# Patient Record
Sex: MALE | Race: WHITE | NOT HISPANIC OR LATINO | URBAN - METROPOLITAN AREA
[De-identification: names, ages, dates, MRNs, and addresses within clinical notes are randomized per-mention and may not be internally consistent; named-entity substitution may affect disease eponyms.]

---

## 2019-11-23 ENCOUNTER — HOSPITAL ENCOUNTER (EMERGENCY)
Facility: HOSPITAL | Age: 39
Discharge: HOME | End: 2019-11-23
Attending: EMERGENCY MEDICINE
Payer: MEDICAID

## 2019-11-23 VITALS
WEIGHT: 150 LBS | RESPIRATION RATE: 16 BRPM | TEMPERATURE: 98 F | OXYGEN SATURATION: 96 % | HEART RATE: 84 BPM | BODY MASS INDEX: 24.99 KG/M2 | SYSTOLIC BLOOD PRESSURE: 131 MMHG | DIASTOLIC BLOOD PRESSURE: 87 MMHG | HEIGHT: 65 IN

## 2019-11-23 DIAGNOSIS — T40.601A: ICD-10-CM

## 2019-11-23 LAB
AMPHET+METHAMPHET UR QL: NORMAL
BARBITURATES UR QL SCN>200 NG/ML: NEGATIVE
BENZODIAZ UR QL SCN>200 NG/ML: NEGATIVE
BZE UR QL SCN: NORMAL
CANNABINOIDS UR QL SCN: NEGATIVE
CREAT UR-MCNC: 69 MG/DL (ref 23–375)
METHADONE UR QL SCN>300 NG/ML: NEGATIVE
OPIATES UR QL SCN: NORMAL
PCP UR QL SCN>25 NG/ML: NEGATIVE
POCT GLUCOSE: 86 MG/DL (ref 70–110)
TOXICOLOGY INFORMATION: NORMAL

## 2019-11-23 PROCEDURE — 99284 EMERGENCY DEPT VISIT MOD MDM: CPT | Mod: 25

## 2019-11-23 PROCEDURE — 99284 EMERGENCY DEPT VISIT MOD MDM: CPT | Mod: ,,, | Performed by: EMERGENCY MEDICINE

## 2019-11-23 PROCEDURE — 80307 DRUG TEST PRSMV CHEM ANLYZR: CPT

## 2019-11-23 PROCEDURE — 93010 EKG 12-LEAD: ICD-10-PCS | Mod: ,,, | Performed by: INTERNAL MEDICINE

## 2019-11-23 PROCEDURE — 93005 ELECTROCARDIOGRAM TRACING: CPT

## 2019-11-23 PROCEDURE — 99284 PR EMERGENCY DEPT VISIT,LEVEL IV: ICD-10-PCS | Mod: ,,, | Performed by: EMERGENCY MEDICINE

## 2019-11-23 PROCEDURE — 93010 ELECTROCARDIOGRAM REPORT: CPT | Mod: ,,, | Performed by: INTERNAL MEDICINE

## 2019-11-23 PROCEDURE — 82962 GLUCOSE BLOOD TEST: CPT

## 2019-11-23 RX ORDER — NALOXONE HYDROCHLORIDE 4 MG/.1ML
SPRAY NASAL
Qty: 2 EACH | Refills: 11 | Status: ON HOLD | OUTPATIENT
Start: 2019-11-23 | End: 2022-08-08 | Stop reason: HOSPADM

## 2019-11-24 NOTE — ED PROVIDER NOTES
Encounter Date: 11/23/2019       History     Chief Complaint   Patient presents with    Altered Mental Status     initial GCS 3, given 2 narcan IN prior to EMS arrival - patient now has GCS 14      39-year-old male presents with an episode of altered mental status with associated pinpoint pupils that responded after receiving 2 mg of Narcan intranasally.  The paramedics tell me that he was at a house with friends who called stating he was unresponsive.  They found him with a decreased respiratory rate but not cyanotic.  He responded almost immediately to Narcan.  Patient states he feels normal now and is not sure what happened.  Patient states he denies headache, chest pain, shortness of breath, pain anywhere.        Review of patient's allergies indicates:  No Known Allergies  No past medical history on file.  No past surgical history on file.  No family history on file.  Social History     Tobacco Use    Smoking status: Not on file   Substance Use Topics    Alcohol use: Not on file    Drug use: Not on file     Review of Systems   Constitutional: Negative for chills and fever.   HENT: Negative for congestion.    Eyes: Negative for visual disturbance.   Respiratory: Negative for shortness of breath.    Cardiovascular: Negative for chest pain.   Gastrointestinal: Negative for abdominal pain.   Endocrine: Negative for polyuria.   Genitourinary: Negative for difficulty urinating.   Musculoskeletal: Negative for arthralgias.   Skin: Negative for rash.   Neurological: Negative for dizziness and headaches.   Hematological: Negative for adenopathy.   Psychiatric/Behavioral: Negative for agitation and suicidal ideas.       Physical Exam     Initial Vitals [11/23/19 1848]   BP Pulse Resp Temp SpO2   134/72 110 20 97.8 °F (36.6 °C) 98 %      MAP       --         Physical Exam    Constitutional: He appears well-developed and well-nourished.   HENT:   Head: Normocephalic and atraumatic.   Eyes: EOM are normal. Pupils are  equal, round, and reactive to light.   Neck: Normal range of motion. Neck supple.   Cardiovascular: Normal rate, regular rhythm, normal heart sounds and intact distal pulses.   Pulmonary/Chest: Breath sounds normal.   Abdominal: Soft. Bowel sounds are normal. He exhibits no distension.   Musculoskeletal: Normal range of motion.   Neurological: He is alert. He has normal strength. No cranial nerve deficit or sensory deficit. GCS score is 15. GCS eye subscore is 4. GCS verbal subscore is 5. GCS motor subscore is 6.   Skin: Skin is warm and dry. Capillary refill takes less than 2 seconds.   Psychiatric: He has a normal mood and affect.         ED Course   Procedures  Labs Reviewed   DRUG SCREEN PANEL, URINE EMERGENCY    Narrative:     yellow and grey   POCT GLUCOSE        ECG Results          EKG 12-lead (In process)  Result time 11/23/19 22:20:40    In process by Interface, Lab In [facility] (11/23/19 22:20:40)                 Narrative:    Test Reason : T40.601A,    Vent. Rate : 084 BPM     Atrial Rate : 084 BPM     P-R Int : 128 ms          QRS Dur : 106 ms      QT Int : 394 ms       P-R-T Axes : 079 061 061 degrees     QTc Int : 465 ms    Normal sinus rhythm  Voltage criteria for left ventricular hypertrophy  Abnormal ECG  When compared with ECG of 14-OCT-1993 19:17,  PREVIOUS ECG IS PRESENT    Referred By: [email or web address]   SELF           Confirmed By:                             Imaging Results          X-Ray Chest 1 View (Final result)  Result time 11/23/19 21:20:23    Final result by Sherif Zavaleta MD (11/23/19 21:20:23)                 Impression:      No acute findings in the chest.      Electronically signed by: Sherif Zavaleta MD  Date:    11/23/2019  Time:    21:20             Narrative:    EXAMINATION:  XR CHEST 1 VIEW    CLINICAL HISTORY:  Poisoning by unspecified narcotics, accidental (unintentional), initial encounter    TECHNIQUE:  Single frontal view of the chest was  performed.    COMPARISON:  None    FINDINGS:  No consolidation, pleural effusion or pneumothorax.    Cardiomediastinal silhouette is unremarkable.                                 Medical Decision Making:   History:   Old Medical Records: I decided to obtain old medical records.  Initial Assessment:   Patient with a syndrome consistent with opiate overdose.  Will monitor for rebound  Clinical Tests:   Radiological Study: Ordered and Reviewed  Medical Tests: Ordered and Reviewed  ED Management:  7:27 PM  I reassessed the patient.  He states these not sure what happened.  He would like a tox screen run to see what type of drugs or in his system.  We discussed opiate overdose and need for observation here.  His vital signs are stable. His pulse ox is 98%.  His lungs are clear.  Mental status is clear.  Will run a tox screen, EKG, blood sugar.      11:04 PM  Patient has had an uneventful stay.  He was sleeping for several hours but maintaining his oxygenation and always easily arousable.  Right now he is easily arousable and alert with a GCS of 15.  He is stable for discharge. I counseled him regarding drug use.  His father is coming to pick him up.  I wrote a prescription for Narcan                                 Clinical Impression:       ICD-10-CM ICD-9-CM   1. Overdose opiate T40.601A 965.00     E850.2         Disposition:   Disposition: Discharged  Condition: Stable                     Patrick Guzman MD  11/23/19 [id number]

## 2019-11-24 NOTE — ED NOTES
Report received from QUE Salazar. Care assumed. Family at bedside. Discharge instructions, diagnosis, medications, and follow up discussed with patient. Patient verbalized understanding. All questions and concerns answered. No needs expressed at the time. Pt is awake, alert and oriented x4 with no acute distress noted. Respirations even and unlabored. Ambulatory out of ED.

## 2020-06-08 ENCOUNTER — HOSPITAL ENCOUNTER (EMERGENCY)
Facility: HOSPITAL | Age: 40
Discharge: HOME | End: 2020-06-08
Attending: EMERGENCY MEDICINE
Payer: MEDICAID

## 2020-06-08 VITALS
HEIGHT: 66 IN | DIASTOLIC BLOOD PRESSURE: 115 MMHG | WEIGHT: 155 LBS | TEMPERATURE: 99 F | HEART RATE: 107 BPM | RESPIRATION RATE: 22 BRPM | BODY MASS INDEX: 24.91 KG/M2 | OXYGEN SATURATION: 97 % | SYSTOLIC BLOOD PRESSURE: 149 MMHG

## 2020-06-08 DIAGNOSIS — S81.802A WOUND OF LEFT LOWER EXTREMITY, INITIAL ENCOUNTER: Primary | ICD-10-CM

## 2020-06-08 DIAGNOSIS — R58 BLEEDING: ICD-10-CM

## 2020-06-08 LAB
ABO + RH BLD: NORMAL
ALBUMIN SERPL BCP-MCNC: 3.9 G/DL (ref 3.5–5.2)
ALP SERPL-CCNC: 67 U/L (ref 55–135)
ALT SERPL W/O P-5'-P-CCNC: 17 U/L (ref 10–44)
ANION GAP SERPL CALC-SCNC: 9 MMOL/L (ref 8–16)
AST SERPL-CCNC: 25 U/L (ref 10–40)
BASOPHILS # BLD AUTO: 0.07 K/UL (ref 0–0.2)
BASOPHILS NFR BLD: 0.9 % (ref 0–1.9)
BILIRUB SERPL-MCNC: 1 MG/DL (ref 0.1–1)
BLD GP AB SCN CELLS X3 SERPL QL: NORMAL
BUN SERPL-MCNC: 29 MG/DL (ref 6–20)
CALCIUM SERPL-MCNC: 9.1 MG/DL (ref 8.7–10.5)
CHLORIDE SERPL-SCNC: 108 MMOL/L (ref 95–110)
CO2 SERPL-SCNC: 23 MMOL/L (ref 23–29)
CREAT SERPL-MCNC: 1.3 MG/DL (ref 0.5–1.4)
DIFFERENTIAL METHOD: ABNORMAL
EOSINOPHIL # BLD AUTO: 0.1 K/UL (ref 0–0.5)
EOSINOPHIL NFR BLD: 1.2 % (ref 0–8)
ERYTHROCYTE [DISTWIDTH] IN BLOOD BY AUTOMATED COUNT: 13.2 % (ref 11.5–14.5)
EST. GFR  (AFRICAN AMERICAN): >60 ML/MIN/1.73 M^2
EST. GFR  (NON AFRICAN AMERICAN): >60 ML/MIN/1.73 M^2
GLUCOSE SERPL-MCNC: 117 MG/DL (ref 70–110)
HCT VFR BLD AUTO: 34.6 % (ref 40–54)
HGB BLD-MCNC: 12.1 G/DL (ref 14–18)
IMM GRANULOCYTES # BLD AUTO: 0.03 K/UL (ref 0–0.04)
IMM GRANULOCYTES NFR BLD AUTO: 0.4 % (ref 0–0.5)
INR PPP: 1 (ref 0.8–1.2)
LYMPHOCYTES # BLD AUTO: 2.2 K/UL (ref 1–4.8)
LYMPHOCYTES NFR BLD: 27.9 % (ref 18–48)
MCH RBC QN AUTO: 29 PG (ref 27–31)
MCHC RBC AUTO-ENTMCNC: 35 G/DL (ref 32–36)
MCV RBC AUTO: 83 FL (ref 82–98)
MONOCYTES # BLD AUTO: 0.7 K/UL (ref 0.3–1)
MONOCYTES NFR BLD: 8.8 % (ref 4–15)
NEUTROPHILS # BLD AUTO: 4.8 K/UL (ref 1.8–7.7)
NEUTROPHILS NFR BLD: 60.8 % (ref 38–73)
NRBC BLD-RTO: 0 /100 WBC
PLATELET # BLD AUTO: 301 K/UL (ref 150–350)
PMV BLD AUTO: 10.5 FL (ref 9.2–12.9)
POTASSIUM SERPL-SCNC: 3.8 MMOL/L (ref 3.5–5.1)
PROT SERPL-MCNC: 7.5 G/DL (ref 6–8.4)
PROTHROMBIN TIME: 10.7 SEC (ref 9–12.5)
RBC # BLD AUTO: 4.17 M/UL (ref 4.6–6.2)
SODIUM SERPL-SCNC: 140 MMOL/L (ref 136–145)
WBC # BLD AUTO: 7.81 K/UL (ref 3.9–12.7)

## 2020-06-08 PROCEDURE — 86901 BLOOD TYPING SEROLOGIC RH(D): CPT

## 2020-06-08 PROCEDURE — 25000003 PHARM REV CODE 250: Performed by: STUDENT IN AN ORGANIZED HEALTH CARE EDUCATION/TRAINING PROGRAM

## 2020-06-08 PROCEDURE — 12034 INTMD RPR S/TR/EXT 7.6-12.5: CPT | Mod: LT

## 2020-06-08 PROCEDURE — 63600175 PHARM REV CODE 636 W HCPCS: Performed by: STUDENT IN AN ORGANIZED HEALTH CARE EDUCATION/TRAINING PROGRAM

## 2020-06-08 PROCEDURE — 85610 PROTHROMBIN TIME: CPT

## 2020-06-08 PROCEDURE — 99284 EMERGENCY DEPT VISIT MOD MDM: CPT | Mod: 25

## 2020-06-08 PROCEDURE — 99284 PR EMERGENCY DEPT VISIT,LEVEL IV: ICD-10-PCS | Mod: 25,,, | Performed by: EMERGENCY MEDICINE

## 2020-06-08 PROCEDURE — 96375 TX/PRO/DX INJ NEW DRUG ADDON: CPT | Mod: 59

## 2020-06-08 PROCEDURE — 25000003 PHARM REV CODE 250: Performed by: EMERGENCY MEDICINE

## 2020-06-08 PROCEDURE — 99284 EMERGENCY DEPT VISIT MOD MDM: CPT | Mod: 25,,, | Performed by: EMERGENCY MEDICINE

## 2020-06-08 PROCEDURE — 12034 INTMD RPR S/TR/EXT 7.6-12.5: CPT | Mod: LT,,, | Performed by: EMERGENCY MEDICINE

## 2020-06-08 PROCEDURE — 63600175 PHARM REV CODE 636 W HCPCS: Performed by: EMERGENCY MEDICINE

## 2020-06-08 PROCEDURE — 12034 PR LAYR CLOS WND TRUNK,ARM,LEG 7.6-12.5 CM: ICD-10-PCS | Mod: LT,,, | Performed by: EMERGENCY MEDICINE

## 2020-06-08 PROCEDURE — 93005 ELECTROCARDIOGRAM TRACING: CPT | Mod: 59

## 2020-06-08 PROCEDURE — 96365 THER/PROPH/DIAG IV INF INIT: CPT | Mod: 59

## 2020-06-08 PROCEDURE — 80053 COMPREHEN METABOLIC PANEL: CPT

## 2020-06-08 PROCEDURE — 93010 EKG 12-LEAD: ICD-10-PCS | Mod: ,,, | Performed by: INTERNAL MEDICINE

## 2020-06-08 PROCEDURE — 85025 COMPLETE CBC W/AUTO DIFF WBC: CPT

## 2020-06-08 PROCEDURE — 93010 ELECTROCARDIOGRAM REPORT: CPT | Mod: ,,, | Performed by: INTERNAL MEDICINE

## 2020-06-08 RX ORDER — LIDOCAINE HYDROCHLORIDE AND EPINEPHRINE 10; 10 MG/ML; UG/ML
20 INJECTION, SOLUTION INFILTRATION; PERINEURAL ONCE
Status: COMPLETED | OUTPATIENT
Start: 2020-06-08 | End: 2020-06-08

## 2020-06-08 RX ORDER — CEPHALEXIN 500 MG/1
500 CAPSULE ORAL 4 TIMES DAILY
Qty: 20 CAPSULE | Refills: 0 | Status: SHIPPED | OUTPATIENT
Start: 2020-06-08 | End: 2020-06-13

## 2020-06-08 RX ORDER — HYDROMORPHONE HYDROCHLORIDE 1 MG/ML
2 INJECTION, SOLUTION INTRAMUSCULAR; INTRAVENOUS; SUBCUTANEOUS
Status: COMPLETED | OUTPATIENT
Start: 2020-06-08 | End: 2020-06-08

## 2020-06-08 RX ADMIN — PIPERACILLIN AND TAZOBACTAM 4.5 G: 4; .5 INJECTION, POWDER, FOR SOLUTION INTRAVENOUS at 10:06

## 2020-06-08 RX ADMIN — HYDROMORPHONE HYDROCHLORIDE 2 MG: 1 INJECTION, SOLUTION INTRAMUSCULAR; INTRAVENOUS; SUBCUTANEOUS at 09:06

## 2020-06-08 RX ADMIN — LIDOCAINE HYDROCHLORIDE AND EPINEPHRINE 20 ML: 10; 10 INJECTION, SOLUTION INFILTRATION; PERINEURAL at 09:06

## 2020-06-09 NOTE — ED PROVIDER NOTES
"Encounter Date: 6/8/2020       History     Chief Complaint   Patient presents with    Leg Injury     bleeding to LLE, hit it against a bag earlier today. left AMA from [facility] because "they were doing nothing for me" tourniquet applied to leg with coban wrapped around wound, bleeding appears controlled     Mr. Lopez is a 39 yo male with pmh of HTN, cocaine abuse, IVDU who presents with chief complaint of leg injury. He said that about 3 hours ago he was carrying a garbage bag full of shard/crafts when all of the sudden he noticed his leg was cut. He does not know what exactly cut him. He was having significant bleeding and pain so he initially presented to  for evaluation. He was there for about 2 hours before leaving AMA. He said he left because "they were not doing anything" and just holding gauze there. He thinks he lost a significant amount of blood. The wound was wrapped and a tourniquet was placed before he came to [facility]. He says the tourniquet is causing significant pain and decreased sensation in his leg. His last tetanus shot was <5 years ago he says.         Review of patient's allergies indicates:  No Known Allergies  No past medical history on file.  No past surgical history on file.  No family history on file.  Social History     Tobacco Use    Smoking status: Not on file   Substance Use Topics    Alcohol use: Not on file    Drug use: Not on file     Review of Systems   Constitutional: Negative for appetite change, chills and fever.   HENT: Negative for rhinorrhea, sinus pain, sore throat and trouble swallowing.    Eyes: Negative for photophobia and visual disturbance.   Respiratory: Negative for cough and shortness of breath.    Cardiovascular: Negative for chest pain and leg swelling.   Gastrointestinal: Negative for abdominal pain, constipation, diarrhea and nausea.   Genitourinary: Negative for difficulty urinating, dysuria, frequency, hematuria and urgency.   Musculoskeletal: Negative for " arthralgias and gait problem.   Skin: Positive for wound. Negative for pallor and rash.   Neurological: Negative for light-headedness and headaches.       Physical Exam     Initial Vitals [06/08/20 2047]   BP Pulse Resp Temp SpO2   (!) 186/110 (!) 131 16 99.4 °F (37.4 °C) 100 %      MAP       --         Physical Exam    Vitals reviewed.  Constitutional: He appears well-developed and well-nourished. He is not diaphoretic. He appears distressed.   He appears uncomfortable and in distress  He is awake and oriented   HENT:   Head: Normocephalic and atraumatic.   Neck: Normal range of motion. Neck supple.   Cardiovascular: Regular rhythm, normal heart sounds and intact distal pulses. Exam reveals no gallop and no friction rub.    No murmur heard.  He is tachycardic in the 110s   Pulmonary/Chest: Breath sounds normal. No respiratory distress. He has no wheezes. He has no rales.   No increased work of breathing and satting 100% on room air   Abdominal: Soft. Bowel sounds are normal. He exhibits no distension. There is no tenderness.   Musculoskeletal: Normal range of motion.   Neurological: He is alert and oriented to person, place, and time.   Skin: Skin is warm and dry.   On removal of bandage and tourniquet on the left lateral calf there is about a 6 cm linear wound that penetrates incompletely through the muscle with oozes  No pulsatility         ED Course   Lac Repair  Date/Time: 6/9/2020 1:54 AM  Performed by: Bree Sharpe MD  Authorized by: Bree Sharpe MD   Consent Done: Emergent Situation  Body area: lower extremity  Location details: left lower leg  Laceration length: 10 cm  Foreign bodies: no foreign bodies  Tendon involvement: none  Nerve involvement: none  Vascular damage: no  Anesthesia: local infiltration    Anesthesia:  Local Anesthetic: lidocaine 1% with epinephrine  Patient sedated: no  Debridement: none  Degree of undermining: none  Skin closure: Ethilon  Subcutaneous closure: 4-0 Vicryl  Number of  sutures: 10  Technique: simple and vertical mattress  Approximation: close  Approximation difficulty: complex  Dressing: 4x4 sterile gauze  Patient tolerance: Patient tolerated the procedure well with no immediate complications        Labs Reviewed   CBC W/ AUTO DIFFERENTIAL - Abnormal; Notable for the following components:       Result Value    RBC 4.17 (*)     Hemoglobin 12.1 (*)     Hematocrit 34.6 (*)     All other components within normal limits   COMPREHENSIVE METABOLIC PANEL - Abnormal; Notable for the following components:    Glucose 117 (*)     BUN, Bld 29 (*)     All other components within normal limits   PROTIME-INR   TYPE & SCREEN          Imaging Results    None          Medical Decision Making:   Differential Diagnosis:   9:30 PM  Lac repaired with bleeding controlled  Tourniquet removed--patient has ability to feel and move  Pulses found with assistance of doppler  CBC with Hgb 12 with no prior baseline    10:30 PM  Bleeding remains controlled with some mild oozing  Wound dressed with non-adhesive bandage, gauze, and Coban  Dose of Zosyn administered        APC / Resident Notes:   Mr. Lopez is a 41 yo male with pmh of HTN, IVDU who presents with chief complaint of leg wound after cutting himself with some scrap/possibly glass with significant bleeding as a result. He originally presented to  however left AMA and came to [facility] when he was unhappy with his care there. On arrival here, he was uncomfortable and in pain, tachycardic but otherwise vitally stable. The wound was a linear laceration about 6 cm long and penetrated partially through what appeared to be muscle. Tourniquet removed which showed significant oozing but no pulsatility. The surrounding tissue was soft, not firm with no significant signs of compartment syndrome. He has sensation and ability to move his extremity. Pulses intact with doppler and palpation. Laceration was able to be repaired with combination of deep absorbable sutures and  superficial sutures which will need to be removed. He was given a dose of Zosyn and dilaudid for pain in the ED. CBC showed Hgb ok at 12. Tachcardia significantly improved with pain control. Wound dressed. He is up to date on tetanus. He was determined stable for discharge with 5 day course of cephalexin, instructions on wound care, and return precautions. He will need sutures removed in 10-14 days.          Attending Attestation:   Physician Attestation Statement for Resident:  As the supervising MD   Physician Attestation Statement: I have personally seen and examined this patient.   I agree with the above history. 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     As the supervising MD I agree with the above PE.   -: General: (+) painful distress  Lungs: CTA  Cardiac: tachycardic  Abd: soft, nontender  Ext: 6 in lateral leg laceration with exposed muscle, oozing, no pulsatile flow. (+) orange tourniquet in place.    As the supervising MD I agree with the above treatment, course, plan, and disposition.   -: Tourniquet removed, oozing of blood from laceration.  Pressure applied, laceration was repaired w/o complication.  Initial signal w doppler    Tetanus is up to date    Labs unremarkable    DP pulse palpable after repair and re-evaluation. Pt stable for discharge.  Follow up with ED or PCP for suture removal in 10-14 days                                    Clinical Impression:       ICD-10-CM ICD-9-CM   1. Wound of left lower extremity, initial encounter S81.802A 894.0   2. Bleeding R58 459.0             ED Disposition Condition    Discharge Stable        ED Prescriptions     Medication Sig Dispense Start Date End Date Auth. Provider    cephALEXin (KEFLEX) 500 MG capsule Take 1 capsule (500 mg total) by mouth 4 (four) times daily. for 5 days 20 capsule 6/8/2020 6/13/2020 Luis Lopez MD        Follow-up Information    None                                    Luis  MD John  Resident  06/08/20 [id number]       Bree Sharpe MD  06/09/20 [id number]

## 2020-06-09 NOTE — DISCHARGE INSTRUCTIONS
You got 10 stitched in your leg today in the Emergency Room    You will need to have the stiches removed in 10-14 days. You can do this at your primary care doctor, urgent care, or this emergency room.    Leave current dressing on for 24 hours and do not get it wet  Then change dressing daily--use first non-adherent dressing, then gauze, and then you can wrap with coban    We are prescribing you some antibiotics as a precaution--please take these for a total of 5 days    Watch out for symptoms and signs of infection including fever, worsening redness, pus, exquisite tenderness. If any of these happen, please see a physician so they can assess your wound.

## 2022-08-06 ENCOUNTER — HOSPITAL ENCOUNTER (INPATIENT)
Facility: HOSPITAL | Age: 42
LOS: 2 days | Discharge: HOME | DRG: 683 | End: 2022-08-08
Attending: EMERGENCY MEDICINE | Admitting: HOSPITALIST
Payer: MEDICAID

## 2022-08-06 DIAGNOSIS — R07.9 CHEST PAIN: ICD-10-CM

## 2022-08-06 DIAGNOSIS — N17.9 AKI (ACUTE KIDNEY INJURY): ICD-10-CM

## 2022-08-06 DIAGNOSIS — R53.83 FATIGUE: ICD-10-CM

## 2022-08-06 DIAGNOSIS — E87.5 HYPERKALEMIA: Primary | ICD-10-CM

## 2022-08-06 LAB
ALBUMIN SERPL BCP-MCNC: 3.3 G/DL (ref 3.5–5.2)
ALBUMIN SERPL BCP-MCNC: 3.8 G/DL (ref 3.5–5.2)
ALLENS TEST: ABNORMAL
ALP SERPL-CCNC: 62 U/L (ref 55–135)
ALP SERPL-CCNC: 70 U/L (ref 55–135)
ALT SERPL W/O P-5'-P-CCNC: 11 U/L (ref 10–44)
ALT SERPL W/O P-5'-P-CCNC: 15 U/L (ref 10–44)
ANION GAP SERPL CALC-SCNC: 16 MMOL/L (ref 8–16)
ANION GAP SERPL CALC-SCNC: 17 MMOL/L (ref 8–16)
AST SERPL-CCNC: 17 U/L (ref 10–40)
AST SERPL-CCNC: 20 U/L (ref 10–40)
BACTERIA #/AREA URNS AUTO: ABNORMAL /HPF
BASOPHILS # BLD AUTO: 0.03 K/UL (ref 0–0.2)
BASOPHILS NFR BLD: 0.6 % (ref 0–1.9)
BILIRUB SERPL-MCNC: 0.7 MG/DL (ref 0.1–1)
BILIRUB SERPL-MCNC: 0.7 MG/DL (ref 0.1–1)
BILIRUB UR QL STRIP: NEGATIVE
BUN SERPL-MCNC: 76 MG/DL (ref 6–20)
BUN SERPL-MCNC: 78 MG/DL (ref 6–20)
BUN SERPL-MCNC: 84 MG/DL (ref 6–30)
BUN SERPL-MCNC: 88 MG/DL (ref 6–30)
CALCIUM SERPL-MCNC: 7.9 MG/DL (ref 8.7–10.5)
CALCIUM SERPL-MCNC: 8.8 MG/DL (ref 8.7–10.5)
CHLORIDE SERPL-SCNC: 102 MMOL/L (ref 95–110)
CHLORIDE SERPL-SCNC: 103 MMOL/L (ref 95–110)
CHLORIDE SERPL-SCNC: 104 MMOL/L (ref 95–110)
CHLORIDE SERPL-SCNC: 105 MMOL/L (ref 95–110)
CK SERPL-CCNC: 110 U/L (ref 20–200)
CLARITY UR REFRACT.AUTO: ABNORMAL
CO2 SERPL-SCNC: 12 MMOL/L (ref 23–29)
CO2 SERPL-SCNC: 17 MMOL/L (ref 23–29)
COLOR UR AUTO: ABNORMAL
CREAT SERPL-MCNC: 8.7 MG/DL (ref 0.5–1.4)
CREAT SERPL-MCNC: 8.8 MG/DL (ref 0.5–1.4)
CREAT SERPL-MCNC: 9.6 MG/DL (ref 0.5–1.4)
CREAT SERPL-MCNC: 9.9 MG/DL (ref 0.5–1.4)
CREAT UR-MCNC: 134 MG/DL (ref 23–375)
DIFFERENTIAL METHOD: ABNORMAL
EOSINOPHIL # BLD AUTO: 0 K/UL (ref 0–0.5)
EOSINOPHIL NFR BLD: 0.4 % (ref 0–8)
ERYTHROCYTE [DISTWIDTH] IN BLOOD BY AUTOMATED COUNT: 13.7 % (ref 11.5–14.5)
EST. GFR  (NO RACE VARIABLE): 7 ML/MIN/1.73 M^2
EST. GFR  (NO RACE VARIABLE): 7.1 ML/MIN/1.73 M^2
GLUCOSE SERPL-MCNC: 122 MG/DL (ref 70–110)
GLUCOSE SERPL-MCNC: 92 MG/DL (ref 70–110)
GLUCOSE SERPL-MCNC: 93 MG/DL (ref 70–110)
GLUCOSE SERPL-MCNC: 98 MG/DL (ref 70–110)
GLUCOSE UR QL STRIP: ABNORMAL
HCO3 UR-SCNC: 17.3 MMOL/L (ref 24–28)
HCT VFR BLD AUTO: 30.4 % (ref 40–54)
HCT VFR BLD CALC: 30 %PCV (ref 36–54)
HCT VFR BLD CALC: 31 %PCV (ref 36–54)
HGB BLD-MCNC: 10.5 G/DL (ref 14–18)
HGB UR QL STRIP: ABNORMAL
HYALINE CASTS UR QL AUTO: 12 /LPF
IMM GRANULOCYTES # BLD AUTO: 0.02 K/UL (ref 0–0.04)
IMM GRANULOCYTES NFR BLD AUTO: 0.4 % (ref 0–0.5)
KETONES UR QL STRIP: ABNORMAL
LACTATE SERPL-SCNC: 1 MMOL/L (ref 0.5–2.2)
LEUKOCYTE ESTERASE UR QL STRIP: ABNORMAL
LYMPHOCYTES # BLD AUTO: 1.4 K/UL (ref 1–4.8)
LYMPHOCYTES NFR BLD: 28.6 % (ref 18–48)
MCH RBC QN AUTO: 29.2 PG (ref 27–31)
MCHC RBC AUTO-ENTMCNC: 34.5 G/DL (ref 32–36)
MCV RBC AUTO: 84 FL (ref 82–98)
MICROSCOPIC COMMENT: ABNORMAL
MONOCYTES # BLD AUTO: 0.5 K/UL (ref 0.3–1)
MONOCYTES NFR BLD: 9.7 % (ref 4–15)
NEUTROPHILS # BLD AUTO: 3 K/UL (ref 1.8–7.7)
NEUTROPHILS NFR BLD: 60.3 % (ref 38–73)
NITRITE UR QL STRIP: NEGATIVE
NRBC BLD-RTO: 0 /100 WBC
PCO2 BLDA: 32.8 MMHG (ref 35–45)
PH SMN: 7.33 [PH] (ref 7.35–7.45)
PH UR STRIP: 5 [PH] (ref 5–8)
PLATELET # BLD AUTO: 257 K/UL (ref 150–450)
PMV BLD AUTO: 10.5 FL (ref 9.2–12.9)
PO2 BLDA: 24 MMHG (ref 40–60)
POC BE: -9 MMOL/L
POC IONIZED CALCIUM: 0.97 MMOL/L (ref 1.06–1.42)
POC IONIZED CALCIUM: 0.98 MMOL/L (ref 1.06–1.42)
POC SATURATED O2: 38 % (ref 95–100)
POC TCO2 (MEASURED): 17 MMOL/L (ref 23–29)
POC TCO2 (MEASURED): 17 MMOL/L (ref 23–29)
POC TCO2: 18 MMOL/L (ref 24–29)
POCT GLUCOSE: 23 MG/DL (ref 70–110)
POCT GLUCOSE: 276 MG/DL (ref 70–110)
POCT GLUCOSE: 98 MG/DL (ref 70–110)
POCT GLUCOSE: 99 MG/DL (ref 70–110)
POTASSIUM BLD-SCNC: 5.1 MMOL/L (ref 3.5–5.1)
POTASSIUM BLD-SCNC: 5.2 MMOL/L (ref 3.5–5.1)
POTASSIUM SERPL-SCNC: 4.5 MMOL/L (ref 3.5–5.1)
POTASSIUM SERPL-SCNC: 5.1 MMOL/L (ref 3.5–5.1)
PROT SERPL-MCNC: 6.6 G/DL (ref 6–8.4)
PROT SERPL-MCNC: 7.5 G/DL (ref 6–8.4)
PROT UR QL STRIP: ABNORMAL
PROT UR-MCNC: 202 MG/DL (ref 0–15)
PROT/CREAT UR: 1.51 MG/G{CREAT} (ref 0–0.2)
RBC # BLD AUTO: 3.6 M/UL (ref 4.6–6.2)
RBC #/AREA URNS AUTO: 23 /HPF (ref 0–4)
SAMPLE: ABNORMAL
SARS-COV-2 RDRP RESP QL NAA+PROBE: NEGATIVE
SITE: ABNORMAL
SODIUM BLD-SCNC: 133 MMOL/L (ref 136–145)
SODIUM BLD-SCNC: 134 MMOL/L (ref 136–145)
SODIUM SERPL-SCNC: 132 MMOL/L (ref 136–145)
SODIUM SERPL-SCNC: 135 MMOL/L (ref 136–145)
SODIUM UR-SCNC: 75 MMOL/L (ref 20–250)
SP GR UR STRIP: 1.02 (ref 1–1.03)
SQUAMOUS #/AREA URNS AUTO: 2 /HPF
TROPONIN I SERPL DL<=0.01 NG/ML-MCNC: <0.006 NG/ML (ref 0–0.03)
URN SPEC COLLECT METH UR: ABNORMAL
WBC # BLD AUTO: 4.97 K/UL (ref 3.9–12.7)
WBC #/AREA URNS AUTO: 43 /HPF (ref 0–5)

## 2022-08-06 PROCEDURE — 82570 ASSAY OF URINE CREATININE: CPT

## 2022-08-06 PROCEDURE — 84300 ASSAY OF URINE SODIUM: CPT

## 2022-08-06 PROCEDURE — U0002 COVID-19 LAB TEST NON-CDC: HCPCS

## 2022-08-06 PROCEDURE — 96361 HYDRATE IV INFUSION ADD-ON: CPT

## 2022-08-06 PROCEDURE — 93010 ELECTROCARDIOGRAM REPORT: CPT | Mod: ,,, | Performed by: INTERNAL MEDICINE

## 2022-08-06 PROCEDURE — 83605 ASSAY OF LACTIC ACID: CPT

## 2022-08-06 PROCEDURE — 87086 URINE CULTURE/COLONY COUNT: CPT

## 2022-08-06 PROCEDURE — 85025 COMPLETE CBC W/AUTO DIFF WBC: CPT

## 2022-08-06 PROCEDURE — 63600175 PHARM REV CODE 636 W HCPCS

## 2022-08-06 PROCEDURE — 93010 EKG 12-LEAD: ICD-10-PCS | Mod: 59,76,, | Performed by: INTERNAL MEDICINE

## 2022-08-06 PROCEDURE — 99291 CRITICAL CARE FIRST HOUR: CPT | Mod: CS,,, | Performed by: EMERGENCY MEDICINE

## 2022-08-06 PROCEDURE — 86803 HEPATITIS C AB TEST: CPT | Performed by: PHYSICIAN ASSISTANT

## 2022-08-06 PROCEDURE — 82803 BLOOD GASES ANY COMBINATION: CPT

## 2022-08-06 PROCEDURE — 82962 GLUCOSE BLOOD TEST: CPT

## 2022-08-06 PROCEDURE — 82550 ASSAY OF CK (CPK): CPT

## 2022-08-06 PROCEDURE — 87040 BLOOD CULTURE FOR BACTERIA: CPT | Mod: 59

## 2022-08-06 PROCEDURE — 12000002 HC ACUTE/MED SURGE SEMI-PRIVATE ROOM

## 2022-08-06 PROCEDURE — 99291 PR CRITICAL CARE, E/M 30-74 MINUTES: ICD-10-PCS | Mod: CS,,, | Performed by: EMERGENCY MEDICINE

## 2022-08-06 PROCEDURE — 99900035 HC TECH TIME PER 15 MIN (STAT)

## 2022-08-06 PROCEDURE — 81001 URINALYSIS AUTO W/SCOPE: CPT

## 2022-08-06 PROCEDURE — 96374 THER/PROPH/DIAG INJ IV PUSH: CPT

## 2022-08-06 PROCEDURE — 84484 ASSAY OF TROPONIN QUANT: CPT

## 2022-08-06 PROCEDURE — 99291 CRITICAL CARE FIRST HOUR: CPT

## 2022-08-06 PROCEDURE — 94761 N-INVAS EAR/PLS OXIMETRY MLT: CPT

## 2022-08-06 PROCEDURE — 25000003 PHARM REV CODE 250

## 2022-08-06 PROCEDURE — 93005 ELECTROCARDIOGRAM TRACING: CPT

## 2022-08-06 PROCEDURE — 80053 COMPREHEN METABOLIC PANEL: CPT | Mod: 91

## 2022-08-06 PROCEDURE — 87389 HIV-1 AG W/HIV-1&-2 AB AG IA: CPT | Performed by: PHYSICIAN ASSISTANT

## 2022-08-06 RX ORDER — SODIUM CHLORIDE 9 MG/ML
1000 INJECTION, SOLUTION INTRAVENOUS
Status: DISCONTINUED | OUTPATIENT
Start: 2022-08-06 | End: 2022-08-06

## 2022-08-06 RX ORDER — TALC
6 POWDER (GRAM) TOPICAL NIGHTLY PRN
Status: DISCONTINUED | OUTPATIENT
Start: 2022-08-06 | End: 2022-08-08 | Stop reason: HOSPADM

## 2022-08-06 RX ORDER — SODIUM CHLORIDE 0.9 % (FLUSH) 0.9 %
10 SYRINGE (ML) INJECTION
Status: DISCONTINUED | OUTPATIENT
Start: 2022-08-06 | End: 2022-08-08 | Stop reason: HOSPADM

## 2022-08-06 RX ORDER — LIDOCAINE HYDROCHLORIDE 20 MG/ML
JELLY TOPICAL
Status: COMPLETED | OUTPATIENT
Start: 2022-08-06 | End: 2022-08-06

## 2022-08-06 RX ORDER — CALCIUM GLUCONATE 20 MG/ML
1 INJECTION, SOLUTION INTRAVENOUS EVERY 10 MIN PRN
Status: DISCONTINUED | OUTPATIENT
Start: 2022-08-06 | End: 2022-08-08 | Stop reason: HOSPADM

## 2022-08-06 RX ORDER — CALCIUM GLUCONATE 20 MG/ML
1 INJECTION, SOLUTION INTRAVENOUS
Status: COMPLETED | OUTPATIENT
Start: 2022-08-06 | End: 2022-08-06

## 2022-08-06 RX ADMIN — INSULIN HUMAN 6.35 UNITS: 100 INJECTION, SOLUTION PARENTERAL at 07:08

## 2022-08-06 RX ADMIN — SODIUM CHLORIDE 1000 ML: 0.9 INJECTION, SOLUTION INTRAVENOUS at 05:08

## 2022-08-06 RX ADMIN — DEXTROSE 250 ML: 10 SOLUTION INTRAVENOUS at 06:08

## 2022-08-06 RX ADMIN — SODIUM BICARBONATE: 84 INJECTION, SOLUTION INTRAVENOUS at 10:08

## 2022-08-06 RX ADMIN — CALCIUM GLUCONATE 1 G: 20 INJECTION, SOLUTION INTRAVENOUS at 06:08

## 2022-08-06 RX ADMIN — LIDOCAINE HYDROCHLORIDE: 20 JELLY TOPICAL at 07:08

## 2022-08-06 NOTE — ED TRIAGE NOTES
42 y.o. pt presents to the ED w/ dizziness and incontinence. Last voided 8/4. 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 Discharged from  yesterday. Reports SOB, N/V, and lower back pain. Denies fever or chills. Report IV drug use few weeks ago.

## 2022-08-06 NOTE — ED NOTES
I-STAT Chem-8+ Results:   Value Reference Range   Sodium 134 136-145 mmol/L   Potassium  5.1 3.5-5.1 mmol/L   Chloride 104  mmol/L   Ionized Calcium 0.97 1.06-1.42 mmol/L   CO2 (measured) 17 23-29 mmol/L   Glucose 93  mg/dL   BUN 84 6-30 mg/dL   Creatinine 9.9 0.5-1.4 mg/dL   Hematocrit 30 36-54%

## 2022-08-06 NOTE — LETTER
August 8, 2022         [address]  [facility] [address]  Phone: [phone]  Fax: [phone]       Patient: Feliz Lopez   YOB: 1980  Date of Visit: 08/08/2022    To Whom It May Concern:    Jase Lopez  was at [facility] on 08/08/2022. The patient may return to work/school on August 15, 2022 with no restrictions. If you have any questions or concerns, or if I can be of further assistance, please do not hesitate to contact me.    Sincerely,    Evaristo Palacios MD

## 2022-08-06 NOTE — ED PROVIDER NOTES
Encounter Date: 8/6/2022       History     Chief Complaint   Patient presents with    Fatigue     Since Tuesday, has not been able to eat or drink since Wednesday, feels weak and dizzy, states he thinks he is dehydrated. was just seen at [facility] yesterday for the same thing.      Patient is a 41 yo male with history of HTN and drug abuse who is presenting for fatigue, lightheadedness, decreased appetite, and decreased urine output. He was out in the heat cutting grass all weak, sweating a lot, and believes he was likely getting dehydrated. He began feeling fatigued with decreased appetite on Tuesday and since then has been getting progressively more fatigued, weak, decreased appetite, and decreased urine output. Yesterday patient felt lightheaded with blurred vision and lost consciousness which led to a fall. He denies hitting his head. This happened twice yesterday. He began feeling nauseas yesterday and vomited last night. He reports 3/10 aching mid-low back pain that began yesterday. He went to [facility] yesterday due to concern for decreased urine output and difficulty urinating; however, workup included bladder scan which showed that he was not retaining urine and was discharged home with PCP f/u. He endorses TERRY, weakness, and abdominal pain described as hunger pains.     The history is provided by the patient.     Review of patient's allergies indicates:  No Known Allergies  Past Medical History:   Diagnosis Date    Hypertension      No past surgical history on file.  No family history on file.  Social History     Tobacco Use    Smoking status: Current Every Day Smoker    Smokeless tobacco: Never Used   Substance Use Topics    Alcohol use: Yes    Drug use: Yes     Review of Systems   Constitutional: Positive for fatigue. Negative for chills, diaphoresis and fever.   HENT: Negative for congestion, rhinorrhea and sore throat.    Eyes: Positive for visual disturbance. Negative for photophobia.    Respiratory: Positive for shortness of breath. Negative for cough.    Cardiovascular: Negative for chest pain, palpitations and leg swelling.   Gastrointestinal: Positive for abdominal pain, nausea and vomiting. Negative for blood in stool, constipation and diarrhea.   Genitourinary: Positive for difficulty urinating. Negative for dysuria, flank pain and hematuria.   Musculoskeletal: Negative for arthralgias and myalgias.   Neurological: Positive for syncope, weakness and light-headedness.   Psychiatric/Behavioral: Negative for confusion and decreased concentration.       Physical Exam     Initial Vitals [08/06/22 1557]   BP Pulse Resp Temp SpO2   (!) 82/49 93 18 97.9 °F (36.6 °C) 99 %      MAP       --         Physical Exam    Nursing note and vitals reviewed.  Constitutional: He is not diaphoretic. No distress.   HENT:   Mouth/Throat: Oropharynx is clear and moist. No oropharyngeal exudate.   Eyes: Conjunctivae and EOM are normal. Right eye exhibits no discharge. Left eye exhibits no discharge.   Neck: Neck supple.   Normal range of motion.  Cardiovascular: Normal rate, regular rhythm and normal heart sounds.   Pulmonary/Chest: Breath sounds normal. No respiratory distress.   Abdominal: Abdomen is soft. There is no abdominal tenderness.   Musculoskeletal:         General: No tenderness or edema.      Cervical back: Normal range of motion and neck supple.     Neurological: He is alert and oriented to person, place, and time.   Skin: Capillary refill takes more than 3 seconds.   Psychiatric: He has a normal mood and affect. Thought content normal.         ED Course   Procedures  Labs Reviewed   CBC W/ AUTO DIFFERENTIAL - Abnormal; Notable for the following components:       Result Value    RBC 3.60 (*)     Hemoglobin 10.5 (*)     Hematocrit 30.4 (*)     All other components within normal limits   COMPREHENSIVE METABOLIC PANEL - Abnormal; Notable for the following components:    Sodium 132 (*)     CO2 12 (*)      BUN 78 (*)     Creatinine 8.7 (*)     Calcium 7.9 (*)     Albumin 3.3 (*)     Anion Gap 17 (*)     eGFR 7.1 (*)     All other components within normal limits   URINALYSIS, REFLEX TO URINE CULTURE - Abnormal; Notable for the following components:    Appearance, UA Cloudy (*)     Protein, UA 2+ (*)     Glucose, UA 1+ (*)     Ketones, UA Trace (*)     Occult Blood UA 1+ (*)     Leukocytes, UA Trace (*)     All other components within normal limits    Narrative:     Specimen Source->Urine   COMPREHENSIVE METABOLIC PANEL - Abnormal; Notable for the following components:    Sodium 135 (*)     CO2 17 (*)     Glucose 122 (*)     BUN 76 (*)     Creatinine 8.8 (*)     eGFR 7.0 (*)     All other components within normal limits    Narrative:     After the second liter of NS is done running.   URINALYSIS MICROSCOPIC - Abnormal; Notable for the following components:    RBC, UA 23 (*)     WBC, UA 43 (*)     Hyaline Casts, UA 12 (*)     All other components within normal limits    Narrative:     Specimen Source->Urine   PROTEIN / CREATININE RATIO, URINE - Abnormal; Notable for the following components:    Protein, Urine Random 202 (*)     Prot/Creat Ratio, Urine 1.51 (*)     All other components within normal limits    Narrative:     ADD ON UNAR AND UPRCR TO ORDER #[id number]  PER GILBERT SHORT JR, MD  08/06/2022  21:42          Specimen Source->Urine   TOXICOLOGY SCREEN, URINE, RANDOM (COMPLIANCE) - Abnormal; Notable for the following components:    Cocaine (Metab.) Presumptive Positive (*)     Amphetamine Screen, Ur Presumptive Positive (*)     All other components within normal limits    Narrative:     Specimen Source->Urine   PHOSPHORUS - Abnormal; Notable for the following components:    Phosphorus 5.6 (*)     All other components within normal limits   COMPREHENSIVE METABOLIC PANEL - Abnormal; Notable for the following components:    Sodium 132 (*)     CO2 22 (*)     Glucose 111 (*)     BUN 79 (*)     Creatinine 7.5 (*)      eGFR 8.5 (*)     All other components within normal limits   CBC W/ AUTO DIFFERENTIAL - Abnormal; Notable for the following components:    RBC 3.74 (*)     Hemoglobin 11.0 (*)     Hematocrit 32.0 (*)     All other components within normal limits   POCT GLUCOSE - Abnormal; Notable for the following components:    POCT Glucose 23 (*)     All other components within normal limits   ISTAT PROCEDURE - Abnormal; Notable for the following components:    POC PH 7.329 (*)     POC PCO2 32.8 (*)     POC PO2 24 (*)     POC HCO3 17.3 (*)     POC SATURATED O2 38 (*)     POC TCO2 18 (*)     All other components within normal limits   ISTAT PROCEDURE - Abnormal; Notable for the following components:    POC BUN 88 (*)     POC Creatinine 9.6 (*)     POC Sodium 133 (*)     POC Potassium 5.2 (*)     POC TCO2 (MEASURED) 17 (*)     POC Ionized Calcium 0.98 (*)     POC Hematocrit 31 (*)     All other components within normal limits   ISTAT PROCEDURE - Abnormal; Notable for the following components:    POC BUN 84 (*)     POC Creatinine 9.9 (*)     POC Sodium 134 (*)     POC TCO2 (MEASURED) 17 (*)     POC Ionized Calcium 0.97 (*)     POC Hematocrit 30 (*)     All other components within normal limits   POCT GLUCOSE - Abnormal; Notable for the following components:    POCT Glucose 276 (*)     All other components within normal limits   CULTURE, BLOOD   CULTURE, BLOOD   CULTURE, URINE   LACTIC ACID, PLASMA   TROPONIN I   CK   SARS-COV-2 RNA AMPLIFICATION, QUAL    Narrative:     Is the patient symptomatic?->No  Is this needed for pre-procedure or pre-op testing?->No   PROTEIN / CREATININE RATIO, URINE   SODIUM, URINE, RANDOM   SODIUM, URINE, RANDOM    Narrative:     ADD ON UNAR AND UPRCR TO ORDER #[id number]  PER GILBERT SHORT JR, MD  08/06/2022  21:42          Specimen Source->Urine   SODIUM, URINE, RANDOM    Narrative:     Specimen Source->Urine   CREATININE, URINE, RANDOM    Narrative:     Specimen Source->Urine   POTASSIUM, URINE, RANDOM     Narrative:     Specimen Source->Urine   UREA NITROGEN, URINE, RANDOM    Narrative:     Specimen Source->Urine   MAGNESIUM   HIV 1 / 2 ANTIBODY   HEPATITIS C ANTIBODY   POCT GLUCOSE, HAND-HELD DEVICE   POCT GLUCOSE   POCT GLUCOSE   POCT GLUCOSE MONITORING CONTINUOUS   ISTAT CHEM8   POCT GLUCOSE MONITORING CONTINUOUS     EKG Readings: (Independently Interpreted)   Initial Reading: No STEMI. Previous EKG: Compared with most recent EKG Previous EKG Date: 06-AUG-2022 17:52. Rhythm: Normal Sinus Rhythm. T Waves Elevated: I, II, III, AVR, AVF, V3, V4, V5 and V6.   Peaked T waves     ECG Results          EKG 12-lead (Final result)  Result time 08/07/22 10:18:04    Final result by Interface, Lab In [facility] (08/07/22 10:18:04)                 Narrative:    Test Reason :     Vent. Rate : 072 BPM     Atrial Rate : 072 BPM     P-R Int : 140 ms          QRS Dur : 102 ms      QT Int : 394 ms       P-R-T Axes : 089 077 059 degrees     QTc Int : 431 ms    Normal sinus rhythm  Moderate voltage criteria for LVH, may be normal variant  Early repolarization  Borderline Abnormal ECG  When compared with ECG of 06-AUG-2022 17:52,  No significant change was found  Confirmed by Bernardo MENDOZA MD ([id number]) on 8/7/2022 10:17:55 AM    Referred By: [email or web address]   SELF           Confirmed By:Bernardo MENDOZA MD                             EKG 12-lead (Final result)  Result time 08/07/22 10:01:51    Final result by Interface, Lab In [facility] (08/07/22 10:01:51)                 Narrative:    Test Reason :     Vent. Rate : 082 BPM     Atrial Rate : 082 BPM     P-R Int : 136 ms          QRS Dur : 090 ms      QT Int : 380 ms       P-R-T Axes : 074 071 069 degrees     QTc Int : 443 ms    Normal sinus rhythm  Early repolarization  Normal ECG  When compared with ECG of 06-AUG-2022 17:52,  No significant change was found  Confirmed by Bernardo MENDOZA MD ([id number]) on 8/7/2022 10:01:45 AM    Referred By: [email or web address]   SELF           Confirmed By:Bernardo MENDOZA MD                              Repeat EKG 12-lead (Final result)  Result time 08/07/22 10:01:55    Final result by Interface, Lab In [facility] (08/07/22 10:01:55)                 Narrative:    Test Reason :     Vent. Rate : 084 BPM     Atrial Rate : 084 BPM     P-R Int : 142 ms          QRS Dur : 094 ms      QT Int : 388 ms       P-R-T Axes : 075 072 069 degrees     QTc Int : 458 ms    Normal sinus rhythm  Early repolarization  Normal ECG  When compared with ECG of 08-JUN-2020 22:03,  No significant change was found  Confirmed by Bernardo MENDOZA MD ([id number]) on 8/7/2022 10:01:47 AM    Referred By: [email or web address]   SELF           Confirmed By:Bernardo MENDOZA MD                            Imaging Results          US Retroperitoneal Complete (Final result)  Result time 08/07/22 01:53:56    Final result by Heraclio Mackay MD (08/07/22 01:53:56)                 Impression:      Borderline high-normal renal arterial resistive indices, which may be seen in the setting of medical renal disease.    2.1 cm left renal simple cyst.    Electronically signed by resident: Juan Carlos Bennett  Date:    08/07/2022  Time:    01:15    Electronically signed by: Heraclio Mackay  Date:    08/07/2022  Time:    01:53             Narrative:    EXAMINATION:  US RETROPERITONEAL COMPLETE    CLINICAL HISTORY:  severe FREDERICK;    TECHNIQUE:  Ultrasound of the kidneys and urinary bladder was performed including color flow and Doppler evaluation of the kidneys.    COMPARISON:  None.    FINDINGS:  Right kidney: The right kidney measures 12.4 cm. No cortical thinning. No loss of corticomedullary distinction. Resistive index measures 0.72.  No mass. No renal stone. No hydronephrosis.    Left kidney: The left kidney measures 13.7 cm. No cortical thinning. No loss of corticomedullary distinction. Resistive index measures 0.68.  Anechoic focus at the lower pole 2.1 x 1.7 x 1.6 cm, likely simple cyst.  No mass. No renal stone. No hydronephrosis.    The bladder is partially distended at the time of  scanning with Leija catheter in place.    Splenic resistive index measures 0.63.                                 Medications   calcium gluconate 1 g in NS IVPB (premixed) (0 g Intravenous Stopped 8/6/22 1843)     And   calcium gluconate 1 g in NS IVPB (premixed) (has no administration in time range)   dextrose 10% bolus 125 mL (has no administration in time range)   dextrose 10% bolus 250 mL (has no administration in time range)   dextrose 10% bolus 250 mL (0 g Intravenous Stopped 8/6/22 1930)     And   dextrose 10% bolus 250 mL (has no administration in time range)     And   insulin regular injection 6.35 Units 0.0635 mL (6.35 Units Intravenous Given 8/6/22 1934)   sodium chloride 0.9% flush 10 mL (has no administration in time range)   melatonin tablet 6 mg (has no administration in time range)   sodium chloride 0.9% flush 5 mL (has no administration in time range)   albuterol-ipratropium 2.5 mg-0.5 mg/3 mL nebulizer solution 3 mL (has no administration in time range)   ondansetron disintegrating tablet 8 mg (has no administration in time range)   prochlorperazine injection Soln 5 mg (has no administration in time range)   polyethylene glycol packet 17 g (17 g Oral Not Given 8/7/22 0900)   bisacodyL suppository 10 mg (has no administration in time range)   simethicone chewable tablet 80 mg (has no administration in time range)   aluminum-magnesium hydroxide-simethicone 200-200-20 mg/5 mL suspension 30 mL (has no administration in time range)   acetaminophen tablet 650 mg (has no administration in time range)   acetaminophen tablet 1,000 mg (has no administration in time range)   naloxone 0.4 mg/mL injection 0.02 mg (has no administration in time range)   glucose chewable tablet 16 g (has no administration in time range)   glucose chewable tablet 24 g (has no administration in time range)   glucagon (human recombinant) injection 1 mg (has no administration in time range)   mupirocin 2 % ointment ( Nasal Given  8/7/22 0807)   0.9%  NaCl infusion ( Intravenous New Bag 8/7/22 1647)   sodium bicarbonate tablet 650 mg (650 mg Oral Given 8/7/22 1134)   cefTRIAXone (ROCEPHIN) 1 g/50 mL D5W IVPB (0 g Intravenous Stopped 8/7/22 1439)   sodium chloride 0.9% bolus 1,000 mL (0 mLs Intravenous Stopped 8/6/22 1854)   sodium chloride 0.9% bolus 1,000 mL (0 mLs Intravenous Stopped 8/6/22 2003)   LIDOcaine HCl 2% urojet ( Mucous Membrane Given 8/6/22 1915)   sodium chloride 0.9% bolus 500 mL (0 mLs Intravenous Stopped 8/7/22 1231)     Medical Decision Making:   History:   Old Medical Records: I decided to obtain old medical records.  Initial Assessment:   Patient is a 41 yo male with history of HTN and drug abuse who is presenting for fatigue, lightheadedness, nausea, decreased appetite, and decreased urine output for the past several days. On arrival, patient is hypotensive to 70's/40's and EKG on monitor significant for peaked T waves.   Differential Diagnosis:   FREDERICK, Rhabdomyolysis, urinary retention, HHS, DKA  Clinical Tests:   Lab Tests: Ordered and Reviewed  Medical Tests: Ordered and Reviewed  ED Management:  Patient noted to have peaked T waves on EKG concerning for hyperkalemia. Aggressive IVF and hyperkalemia protocol with calcium gluconate, insulin, and D5W. K noted to be 5.2 and Cr of 9.6. Discussed with nephrology. FREDERICK and AGMA noted with normal lactic acid. Placed avina with strict I & O's. Maintenance fluids with isotonic bicarb. Repeat EKG shows improvement in the elevated T waves. Pressures showed improvement with fluids but are still on the soft side 100's/50's. Will admit to inpatient hospital medicine.             Attending Attestation:   Physician Attestation Statement for Resident:  As the supervising MD   Physician Attestation Statement: I have personally seen and examined this patient.   I agree with the above history. -:   As the supervising MD I agree with the above PE.    As the supervising MD I agree with the  above treatment, course, plan, and disposition.        Attending Critical Care:   Critical Care Times:   Direct Patient Care (initial evaluation, reassessments, and time considering the case)................................................................16 minutes.   Additional History from reviewing old medical records or taking additional history from the family, EMS, PCP, etc.......................7 minutes.   Ordering, Reviewing, and Interpreting Diagnostic Studies...............................................................................................................8 minutes.   Documentation..................................................................................................................................................................................7 minutes.   Consultation with other Physicians. .................................................................................................................................................7 minutes.   Consultation with the patient's family directly relating to the patient's condition, care, and DNR status (when patient unable)......5 minutes.   ==============================================================  · Total Critical Care Time - exclusive of procedural time: 50 minutes.  ==============================================================  Critical care was necessary to treat or prevent imminent or life-threatening deterioration of the following conditions: renal failure.   Critical care was time spent personally by me on the following activities: obtaining history from patient or relative, examination of patient, ordering lab, x-rays, and/or EKG, development of treatment plan with patient or relative, evaluation of patient's response to treatment and discussion with consultants.   Critical Care Condition: life-threatening       Attending ED Notes:   I have seen the patient with the resident and reviewed the history and physical,  assessment and plan. I have personally interviewed and examined the patient at bedside and agree with the findings, assessment and plan w/ the following comments:    42-year-old past medical history of hypertension and drug abuse presenting with fatigue, decreased appetite lightheadedness and decreased urinary output.  Patient mentions symptoms have been having poor past 3 days after doing strenuous work cutting grass in the heat.  Patient initially presented to [facility] was evaluated and discharged.  Patient mentions no laboratory testing was made at the time.  Physical exam patient appears fatigued hypotensive.  Laboratory testing noted for acute renal failure with hyperkalemia and EKG changes.  Patient given insulin, glucose, calcium case discussed with Nephrology and ICU patient stable for the floor admitted.      ED Course as of 08/07/22 1721   Sat Aug 06, 2022   1824 POC PH(!): 7.329 [MM]   1915 Potassium: 5.1 [MM]   1916 eGFR(!): 7.1 [MM]   1916 Anion Gap(!): 17 [MM]   1916 CO2(!): 12 [MM]   1916 POC Glucose: 93 [MM]   1916 POC Creatinine(!): 9.9 [MM]      ED Course User Index  [MM] Becca Og MD             Clinical Impression:   Final diagnoses:  [R53.83] Fatigue  [E87.5] Hyperkalemia (Primary)  [N17.9] FREDERICK (acute kidney injury)          ED Disposition Condition    Admit               Becca Og MD  Resident  08/07/22 0122       Bishop Ahumada Jr., MD  08/07/22 1721

## 2022-08-07 PROBLEM — E87.29 HIGH ANION GAP METABOLIC ACIDOSIS: Status: ACTIVE | Noted: 2022-08-07

## 2022-08-07 PROBLEM — I95.9 HYPOTENSION: Status: ACTIVE | Noted: 2022-08-07

## 2022-08-07 PROBLEM — N17.9 ACUTE RENAL FAILURE: Status: ACTIVE | Noted: 2022-08-07

## 2022-08-07 PROBLEM — E87.5 HYPERKALEMIA: Status: ACTIVE | Noted: 2022-08-07

## 2022-08-07 LAB
ALBUMIN SERPL BCP-MCNC: 3.5 G/DL (ref 3.5–5.2)
ALP SERPL-CCNC: 62 U/L (ref 55–135)
ALT SERPL W/O P-5'-P-CCNC: 14 U/L (ref 10–44)
AMPHET+METHAMPHET UR QL: ABNORMAL
ANION GAP SERPL CALC-SCNC: 11 MMOL/L (ref 8–16)
AST SERPL-CCNC: 20 U/L (ref 10–40)
BARBITURATES UR QL SCN>200 NG/ML: NEGATIVE
BASOPHILS # BLD AUTO: 0.04 K/UL (ref 0–0.2)
BASOPHILS NFR BLD: 0.6 % (ref 0–1.9)
BENZODIAZ UR QL SCN>200 NG/ML: NEGATIVE
BILIRUB SERPL-MCNC: 0.7 MG/DL (ref 0.1–1)
BUN SERPL-MCNC: 79 MG/DL (ref 6–20)
BZE UR QL SCN: ABNORMAL
CALCIUM SERPL-MCNC: 8.7 MG/DL (ref 8.7–10.5)
CANNABINOIDS UR QL SCN: NEGATIVE
CHLORIDE SERPL-SCNC: 99 MMOL/L (ref 95–110)
CO2 SERPL-SCNC: 22 MMOL/L (ref 23–29)
CREAT SERPL-MCNC: 7.5 MG/DL (ref 0.5–1.4)
CREAT UR-MCNC: 181 MG/DL (ref 23–375)
CREAT UR-MCNC: 181 MG/DL (ref 23–375)
DIFFERENTIAL METHOD: ABNORMAL
EOSINOPHIL # BLD AUTO: 0 K/UL (ref 0–0.5)
EOSINOPHIL NFR BLD: 0.6 % (ref 0–8)
ERYTHROCYTE [DISTWIDTH] IN BLOOD BY AUTOMATED COUNT: 13.5 % (ref 11.5–14.5)
EST. GFR  (NO RACE VARIABLE): 8.5 ML/MIN/1.73 M^2
ETHANOL UR-MCNC: <10 MG/DL
GLUCOSE SERPL-MCNC: 111 MG/DL (ref 70–110)
HCT VFR BLD AUTO: 32 % (ref 40–54)
HGB BLD-MCNC: 11 G/DL (ref 14–18)
IMM GRANULOCYTES # BLD AUTO: 0.02 K/UL (ref 0–0.04)
IMM GRANULOCYTES NFR BLD AUTO: 0.3 % (ref 0–0.5)
LYMPHOCYTES # BLD AUTO: 1.3 K/UL (ref 1–4.8)
LYMPHOCYTES NFR BLD: 18.5 % (ref 18–48)
MAGNESIUM SERPL-MCNC: 2.5 MG/DL (ref 1.6–2.6)
MCH RBC QN AUTO: 29.4 PG (ref 27–31)
MCHC RBC AUTO-ENTMCNC: 34.4 G/DL (ref 32–36)
MCV RBC AUTO: 86 FL (ref 82–98)
METHADONE UR QL SCN>300 NG/ML: NEGATIVE
MONOCYTES # BLD AUTO: 0.6 K/UL (ref 0.3–1)
MONOCYTES NFR BLD: 8.7 % (ref 4–15)
NEUTROPHILS # BLD AUTO: 4.9 K/UL (ref 1.8–7.7)
NEUTROPHILS NFR BLD: 71.3 % (ref 38–73)
NRBC BLD-RTO: 0 /100 WBC
OPIATES UR QL SCN: NEGATIVE
PCP UR QL SCN>25 NG/ML: NEGATIVE
PHOSPHATE SERPL-MCNC: 5.6 MG/DL (ref 2.7–4.5)
PLATELET # BLD AUTO: 253 K/UL (ref 150–450)
PMV BLD AUTO: 10.7 FL (ref 9.2–12.9)
POCT GLUCOSE: 104 MG/DL (ref 70–110)
POCT GLUCOSE: 105 MG/DL (ref 70–110)
POCT GLUCOSE: 107 MG/DL (ref 70–110)
POCT GLUCOSE: 126 MG/DL (ref 70–110)
POCT GLUCOSE: 163 MG/DL (ref 70–110)
POTASSIUM SERPL-SCNC: 4.7 MMOL/L (ref 3.5–5.1)
POTASSIUM UR-SCNC: 21 MMOL/L (ref 15–95)
PROT SERPL-MCNC: 6.7 G/DL (ref 6–8.4)
RBC # BLD AUTO: 3.74 M/UL (ref 4.6–6.2)
SODIUM SERPL-SCNC: 132 MMOL/L (ref 136–145)
SODIUM UR-SCNC: 82 MMOL/L (ref 20–250)
TOXICOLOGY INFORMATION: ABNORMAL
UUN UR-MCNC: 402 MG/DL (ref 140–1050)
WBC # BLD AUTO: 6.86 K/UL (ref 3.9–12.7)

## 2022-08-07 PROCEDURE — 83735 ASSAY OF MAGNESIUM: CPT | Performed by: STUDENT IN AN ORGANIZED HEALTH CARE EDUCATION/TRAINING PROGRAM

## 2022-08-07 PROCEDURE — 99222 PR INITIAL HOSPITAL CARE,LEVL II: ICD-10-PCS | Mod: ,,, | Performed by: INTERNAL MEDICINE

## 2022-08-07 PROCEDURE — 99222 1ST HOSP IP/OBS MODERATE 55: CPT | Mod: ,,, | Performed by: INTERNAL MEDICINE

## 2022-08-07 PROCEDURE — 36415 COLL VENOUS BLD VENIPUNCTURE: CPT

## 2022-08-07 PROCEDURE — 99223 1ST HOSP IP/OBS HIGH 75: CPT | Mod: ,,,

## 2022-08-07 PROCEDURE — 85025 COMPLETE CBC W/AUTO DIFF WBC: CPT | Performed by: STUDENT IN AN ORGANIZED HEALTH CARE EDUCATION/TRAINING PROGRAM

## 2022-08-07 PROCEDURE — 87040 BLOOD CULTURE FOR BACTERIA: CPT | Mod: 59

## 2022-08-07 PROCEDURE — 11000001 HC ACUTE MED/SURG PRIVATE ROOM

## 2022-08-07 PROCEDURE — 80053 COMPREHEN METABOLIC PANEL: CPT | Performed by: STUDENT IN AN ORGANIZED HEALTH CARE EDUCATION/TRAINING PROGRAM

## 2022-08-07 PROCEDURE — 25000003 PHARM REV CODE 250

## 2022-08-07 PROCEDURE — 25000003 PHARM REV CODE 250: Performed by: STUDENT IN AN ORGANIZED HEALTH CARE EDUCATION/TRAINING PROGRAM

## 2022-08-07 PROCEDURE — 84300 ASSAY OF URINE SODIUM: CPT

## 2022-08-07 PROCEDURE — 99223 PR INITIAL HOSPITAL CARE,LEVL III: ICD-10-PCS | Mod: ,,,

## 2022-08-07 PROCEDURE — 84100 ASSAY OF PHOSPHORUS: CPT | Performed by: STUDENT IN AN ORGANIZED HEALTH CARE EDUCATION/TRAINING PROGRAM

## 2022-08-07 PROCEDURE — 80307 DRUG TEST PRSMV CHEM ANLYZR: CPT | Performed by: STUDENT IN AN ORGANIZED HEALTH CARE EDUCATION/TRAINING PROGRAM

## 2022-08-07 PROCEDURE — 84540 ASSAY OF URINE/UREA-N: CPT | Performed by: STUDENT IN AN ORGANIZED HEALTH CARE EDUCATION/TRAINING PROGRAM

## 2022-08-07 PROCEDURE — 63600175 PHARM REV CODE 636 W HCPCS

## 2022-08-07 PROCEDURE — 84133 ASSAY OF URINE POTASSIUM: CPT | Performed by: STUDENT IN AN ORGANIZED HEALTH CARE EDUCATION/TRAINING PROGRAM

## 2022-08-07 RX ORDER — ACETAMINOPHEN 500 MG
1000 TABLET ORAL EVERY 8 HOURS PRN
Status: DISCONTINUED | OUTPATIENT
Start: 2022-08-07 | End: 2022-08-08 | Stop reason: HOSPADM

## 2022-08-07 RX ORDER — IBUPROFEN 200 MG
24 TABLET ORAL
Status: DISCONTINUED | OUTPATIENT
Start: 2022-08-07 | End: 2022-08-08 | Stop reason: HOSPADM

## 2022-08-07 RX ORDER — PROCHLORPERAZINE EDISYLATE 5 MG/ML
5 INJECTION INTRAMUSCULAR; INTRAVENOUS EVERY 6 HOURS PRN
Status: DISCONTINUED | OUTPATIENT
Start: 2022-08-07 | End: 2022-08-08 | Stop reason: HOSPADM

## 2022-08-07 RX ORDER — BISACODYL 10 MG
10 SUPPOSITORY, RECTAL RECTAL DAILY PRN
Status: DISCONTINUED | OUTPATIENT
Start: 2022-08-07 | End: 2022-08-08 | Stop reason: HOSPADM

## 2022-08-07 RX ORDER — SODIUM CHLORIDE 9 MG/ML
INJECTION, SOLUTION INTRAVENOUS CONTINUOUS
Status: DISCONTINUED | OUTPATIENT
Start: 2022-08-07 | End: 2022-08-08

## 2022-08-07 RX ORDER — SODIUM BICARBONATE 650 MG/1
650 TABLET ORAL 2 TIMES DAILY
Status: DISCONTINUED | OUTPATIENT
Start: 2022-08-07 | End: 2022-08-08 | Stop reason: HOSPADM

## 2022-08-07 RX ORDER — MUPIROCIN 20 MG/G
OINTMENT TOPICAL 2 TIMES DAILY
Status: DISCONTINUED | OUTPATIENT
Start: 2022-08-07 | End: 2022-08-08 | Stop reason: HOSPADM

## 2022-08-07 RX ORDER — ACETAMINOPHEN 325 MG/1
650 TABLET ORAL EVERY 4 HOURS PRN
Status: DISCONTINUED | OUTPATIENT
Start: 2022-08-07 | End: 2022-08-08 | Stop reason: HOSPADM

## 2022-08-07 RX ORDER — IPRATROPIUM BROMIDE AND ALBUTEROL SULFATE 2.5; .5 MG/3ML; MG/3ML
3 SOLUTION RESPIRATORY (INHALATION) EVERY 4 HOURS PRN
Status: DISCONTINUED | OUTPATIENT
Start: 2022-08-07 | End: 2022-08-08 | Stop reason: HOSPADM

## 2022-08-07 RX ORDER — GLUCAGON 1 MG
1 KIT INJECTION
Status: DISCONTINUED | OUTPATIENT
Start: 2022-08-07 | End: 2022-08-08 | Stop reason: HOSPADM

## 2022-08-07 RX ORDER — SIMETHICONE 80 MG
1 TABLET,CHEWABLE ORAL 4 TIMES DAILY PRN
Status: DISCONTINUED | OUTPATIENT
Start: 2022-08-07 | End: 2022-08-08 | Stop reason: HOSPADM

## 2022-08-07 RX ORDER — IBUPROFEN 200 MG
16 TABLET ORAL
Status: DISCONTINUED | OUTPATIENT
Start: 2022-08-07 | End: 2022-08-08 | Stop reason: HOSPADM

## 2022-08-07 RX ORDER — SULFAMETHOXAZOLE AND TRIMETHOPRIM 800; 160 MG/1; MG/1
1 TABLET ORAL 2 TIMES DAILY
COMMUNITY
Start: 2022-08-01

## 2022-08-07 RX ORDER — POLYETHYLENE GLYCOL 3350 17 G/17G
17 POWDER, FOR SOLUTION ORAL DAILY
Status: DISCONTINUED | OUTPATIENT
Start: 2022-08-07 | End: 2022-08-08 | Stop reason: HOSPADM

## 2022-08-07 RX ORDER — ONDANSETRON 8 MG/1
8 TABLET, ORALLY DISINTEGRATING ORAL EVERY 8 HOURS PRN
Status: DISCONTINUED | OUTPATIENT
Start: 2022-08-07 | End: 2022-08-08 | Stop reason: HOSPADM

## 2022-08-07 RX ORDER — TRAMADOL HYDROCHLORIDE 50 MG/1
50 TABLET ORAL EVERY 6 HOURS PRN
COMMUNITY
Start: 2022-08-01

## 2022-08-07 RX ORDER — SODIUM CHLORIDE 0.9 % (FLUSH) 0.9 %
5 SYRINGE (ML) INJECTION
Status: DISCONTINUED | OUTPATIENT
Start: 2022-08-07 | End: 2022-08-08 | Stop reason: HOSPADM

## 2022-08-07 RX ORDER — MAG HYDROX/ALUMINUM HYD/SIMETH 200-200-20
30 SUSPENSION, ORAL (FINAL DOSE FORM) ORAL 4 TIMES DAILY PRN
Status: DISCONTINUED | OUTPATIENT
Start: 2022-08-07 | End: 2022-08-08 | Stop reason: HOSPADM

## 2022-08-07 RX ORDER — ACETAMINOPHEN 500 MG
1000 TABLET ORAL DAILY PRN
COMMUNITY

## 2022-08-07 RX ORDER — NALOXONE HCL 0.4 MG/ML
0.02 VIAL (ML) INJECTION
Status: DISCONTINUED | OUTPATIENT
Start: 2022-08-07 | End: 2022-08-08 | Stop reason: HOSPADM

## 2022-08-07 RX ADMIN — SODIUM BICARBONATE: 84 INJECTION, SOLUTION INTRAVENOUS at 06:08

## 2022-08-07 RX ADMIN — SODIUM CHLORIDE: 0.9 INJECTION, SOLUTION INTRAVENOUS at 04:08

## 2022-08-07 RX ADMIN — SODIUM BICARBONATE 650 MG TABLET 650 MG: at 09:08

## 2022-08-07 RX ADMIN — CEFTRIAXONE 1 G: 1 INJECTION, SOLUTION INTRAVENOUS at 02:08

## 2022-08-07 RX ADMIN — MUPIROCIN: 20 OINTMENT TOPICAL at 09:08

## 2022-08-07 RX ADMIN — SODIUM CHLORIDE 500 ML: 0.9 INJECTION, SOLUTION INTRAVENOUS at 11:08

## 2022-08-07 RX ADMIN — MUPIROCIN: 20 OINTMENT TOPICAL at 08:08

## 2022-08-07 RX ADMIN — SODIUM BICARBONATE 650 MG TABLET 650 MG: at 11:08

## 2022-08-07 RX ADMIN — SODIUM CHLORIDE: 0.9 INJECTION, SOLUTION INTRAVENOUS at 11:08

## 2022-08-07 NOTE — SUBJECTIVE & OBJECTIVE
Past Medical History:   Diagnosis Date    Hypertension        No past surgical history on file.    Review of patient's allergies indicates:  No Known Allergies    No current facility-administered medications on file prior to encounter.     Current Outpatient Medications on File Prior to Encounter   Medication Sig    lisinopriL (PRINIVIL,ZESTRIL) 40 MG tablet Take 20 mg by mouth.    naloxone (NARCAN) 4 mg/actuation Spry 4mg by nasal route as needed for opioid overdose; may repeat every 2-3 minutes in alternating nostrils until medical help arrives. Call 911    naloxone (NARCAN) 4 mg/actuation Spry 4mg by nasal route as needed for opioid overdose; may repeat every 2-3 minutes in alternating nostrils until medical help arrives. Call 911     Family History    None       Tobacco Use    Smoking status: Current Every Day Smoker    Smokeless tobacco: Never Used   Substance and Sexual Activity    Alcohol use: Yes    Drug use: Yes    Sexual activity: Not on file     Review of Systems   Constitutional:  Positive for activity change, appetite change and fatigue. Negative for chills and fever.   HENT:  Negative for trouble swallowing.    Eyes:  Negative for photophobia and visual disturbance.   Respiratory:  Negative for chest tightness, shortness of breath and wheezing.    Cardiovascular:  Negative for chest pain, palpitations and leg swelling.   Gastrointestinal:  Negative for abdominal pain, constipation, diarrhea, nausea and vomiting.   Genitourinary:  Positive for decreased urine volume and difficulty urinating. Negative for dysuria, frequency, hematuria, penile discharge and urgency.   Musculoskeletal:  Positive for back pain. Negative for arthralgias and gait problem.   Skin:  Negative for color change and rash.   Neurological:  Positive for dizziness and light-headedness. Negative for syncope, weakness, numbness and headaches.   Psychiatric/Behavioral:  Negative for agitation and confusion. The patient is not  nervous/anxious.    Objective:     Vital Signs (Most Recent):  Temp: 98 °F (36.7 °C) (08/06/22 2246)  Pulse: 66 (08/06/22 2246)  Resp: 19 (08/06/22 2246)  BP: (!) 107/53 (08/06/22 2246)  SpO2: 100 % (08/06/22 2246)   Vital Signs (24h Range):  Temp:  [97.9 °F (36.6 °C)-98 °F (36.7 °C)] 98 °F (36.7 °C)  Pulse:  [65-93] 66  Resp:  [12-24] 19  SpO2:  [99 %-100 %] 100 %  BP: ()/(37-58) 107/53     Weight: 63.5 kg (140 lb)  Body mass index is 22.6 kg/m².    Physical Exam  Vitals and nursing note reviewed.   Constitutional:       General: He is not in acute distress.     Appearance: He is well-developed and normal weight. He is ill-appearing.   HENT:      Head: Normocephalic and atraumatic.   Eyes:      Extraocular Movements: Extraocular movements intact.   Cardiovascular:      Rate and Rhythm: Normal rate and regular rhythm.      Heart sounds: Normal heart sounds.   Pulmonary:      Effort: Pulmonary effort is normal. No respiratory distress.      Breath sounds: Normal breath sounds. No wheezing.   Abdominal:      General: Abdomen is flat. Bowel sounds are normal. There is no distension.      Palpations: Abdomen is soft.      Tenderness: There is no abdominal tenderness.   Musculoskeletal:         General: No tenderness. Normal range of motion.      Cervical back: Normal range of motion and neck supple.   Skin:     General: Skin is warm and dry.      Capillary Refill: Capillary refill takes less than 2 seconds.      Findings: No rash.   Neurological:      Mental Status: He is oriented to person, place, and time. He is lethargic.      Cranial Nerves: No cranial nerve deficit.      Sensory: No sensory deficit.      Coordination: Coordination normal.   Psychiatric:         Attention and Perception: Attention normal.         Mood and Affect: Mood normal.         Speech: Speech normal.         Behavior: Behavior normal. Behavior is cooperative.         Thought Content: Thought content normal.         Judgment: Judgment  normal.           Significant Labs: All pertinent labs within the past 24 hours have been reviewed.  CBC:   Recent Labs   Lab 08/06/22  1810 08/06/22 1822 08/06/22 1822   WBC  --  4.97  --    HGB  --  10.5*  --    HCT 31* 30.4* 30*   PLT  --  257  --      CMP:   Recent Labs   Lab 08/06/22 1822 08/06/22 2021   * 135*   K 5.1 4.5    102   CO2 12* 17*   GLU 92 122*   BUN 78* 76*   CREATININE 8.7* 8.8*   CALCIUM 7.9* 8.8   PROT 6.6 7.5   ALBUMIN 3.3* 3.8   BILITOT 0.7 0.7   ALKPHOS 62 70   AST 17 20   ALT 11 15   ANIONGAP 17* 16     Lactic Acid:   Recent Labs   Lab 08/06/22 1822   LACTATE 1.0     POCT Glucose:   Recent Labs   Lab 08/06/22  1804 08/06/22 1932 08/06/22  2030   POCTGLUCOSE 98 276* 99     Urine Studies:   Recent Labs   Lab 08/06/22 1943   COLORU Katherine   APPEARANCEUA Cloudy*   PHUR 5.0   SPECGRAV 1.020   PROTEINUA 2+*   GLUCUA 1+*   KETONESU Trace*   BILIRUBINUA Negative   OCCULTUA 1+*   NITRITE Negative   LEUKOCYTESUR Trace*   RBCUA 23*   WBCUA 43*   BACTERIA Occasional   SQUAMEPITHEL 2   HYALINECASTS 12*       Significant Imaging: I have reviewed all pertinent imaging results/findings within the past 24 hours.

## 2022-08-07 NOTE — ED NOTES
Telemetry Verification   Patient placed on Telemetry Box  Verified with War Room  Box # [id number]   Monitor Tech    Rate 70   Rhythm Normal sinus

## 2022-08-07 NOTE — ASSESSMENT & PLAN NOTE
- BP as low at 69/37 in the ED, likely due to IVVD  - received 2L NS in the ED with some improvement up to 107/53  - will hold lisinopril in setting of hypotension and FREDERICK, recommend discontinuing lisinopril at discharge and introducing a new BP agent  - receiving continuous IVF throughout the night  - monitor closely overnight

## 2022-08-07 NOTE — SUBJECTIVE & OBJECTIVE
Past Medical History:   Diagnosis Date    Hypertension        No past surgical history on file.    Review of patient's allergies indicates:  No Known Allergies  Current Facility-Administered Medications   Medication Frequency    acetaminophen tablet 1,000 mg Q8H PRN    acetaminophen tablet 650 mg Q4H PRN    albuterol-ipratropium 2.5 mg-0.5 mg/3 mL nebulizer solution 3 mL Q4H PRN    aluminum-magnesium hydroxide-simethicone 200-200-20 mg/5 mL suspension 30 mL QID PRN    bisacodyL suppository 10 mg Daily PRN    calcium gluconate 1 g in NS IVPB (premixed) Q10 Min PRN    dextrose 10% bolus 125 mL PRN    dextrose 10% bolus 250 mL PRN    dextrose 10% bolus 250 mL PRN    glucagon (human recombinant) injection 1 mg PRN    glucose chewable tablet 16 g PRN    glucose chewable tablet 24 g PRN    melatonin tablet 6 mg Nightly PRN    mupirocin 2 % ointment BID    naloxone 0.4 mg/mL injection 0.02 mg PRN    ondansetron disintegrating tablet 8 mg Q8H PRN    polyethylene glycol packet 17 g Daily    prochlorperazine injection Soln 5 mg Q6H PRN    simethicone chewable tablet 80 mg QID PRN    sodium bicarbonate 150 mEq in dextrose 5 % 1,000 mL infusion Continuous    sodium chloride 0.9% flush 10 mL PRN    sodium chloride 0.9% flush 5 mL PRN     Family History    None       Tobacco Use    Smoking status: Current Every Day Smoker    Smokeless tobacco: Never Used   Substance and Sexual Activity    Alcohol use: Yes    Drug use: Yes    Sexual activity: Not on file     Review of Systems   Constitutional:  Positive for activity change, appetite change and fatigue. Negative for fever.   Cardiovascular:  Negative for chest pain and leg swelling.   Gastrointestinal:  Negative for abdominal pain, diarrhea, nausea and vomiting.   Genitourinary:  Positive for decreased urine volume. Negative for dysuria, enuresis and flank pain.   Neurological:  Positive for dizziness.   Objective:     Vital Signs (Most Recent):  Temp: 98.1 °F (36.7 °C) (08/07/22  0530)  Pulse: 64 (08/07/22 0530)  Resp: 18 (08/07/22 0530)  BP: 129/60 (08/07/22 0530)  SpO2: 100 % (08/07/22 0530)  O2 Device (Oxygen Therapy): room air (08/07/22 0416) Vital Signs (24h Range):  Temp:  [97.9 °F (36.6 °C)-98.1 °F (36.7 °C)] 98.1 °F (36.7 °C)  Pulse:  [64-93] 64  Resp:  [12-24] 18  SpO2:  [99 %-100 %] 100 %  BP: ()/(37-60) 129/60     Weight: 64.6 kg (142 lb 6.7 oz) (08/07/22 0530)  Body mass index is 22.99 kg/m².  Body surface area is 1.73 meters squared.    I/O last 3 completed shifts:  In: -   Out: 200 [Urine:200]    Physical Exam  Constitutional:       General: He is not in acute distress.     Appearance: He is ill-appearing.   HENT:      Head: Normocephalic and atraumatic.      Mouth/Throat:      Mouth: Mucous membranes are dry.   Eyes:      Pupils: Pupils are equal, round, and reactive to light.   Cardiovascular:      Rate and Rhythm: Normal rate.   Pulmonary:      Effort: Pulmonary effort is normal. No respiratory distress.   Abdominal:      General: There is no distension.      Palpations: Abdomen is soft.   Musculoskeletal:         General: No swelling. Normal range of motion.      Right lower leg: No edema.      Left lower leg: No edema.   Skin:     General: Skin is warm and dry.      Coloration: Skin is not jaundiced.   Neurological:      Mental Status: He is oriented to person, place, and time.       Significant Labs:  CBC:   Recent Labs   Lab 08/07/22 0442   WBC 6.86   RBC 3.74*   HGB 11.0*   HCT 32.0*      MCV 86   MCH 29.4   MCHC 34.4     CMP:   Recent Labs   Lab 08/07/22 0442   *   CALCIUM 8.7   ALBUMIN 3.5   PROT 6.7   *   K 4.7   CO2 22*   CL 99   BUN 79*   CREATININE 7.5*   ALKPHOS 62   ALT 14   AST 20   BILITOT 0.7

## 2022-08-07 NOTE — ASSESSMENT & PLAN NOTE
Oliguric FREDERICK, likely ATN in the setting of hypotension and decreased fluid intake   Baseline serum Cr unclear; at 1.3 back in 2020 however recent ED visit back in June with Cr at 3.9   Serum Cr at 8.8 on admission; down to 7.5 this AM   Urine sodium at 82   Anion gap metabolic acidosis likely due to renal failure  Recommend to continue volume expansion with isotonic bicarb but to decrease rate to 100cc/h for now  Obtain retroperitoneal US   Will get urine for microscopy   Keep avina for strict I/Os    RFP q12h for now    Dose medications to GFR

## 2022-08-07 NOTE — H&P
[facility] Medicine  History & Physical    Patient Name: Feliz Lopez  MRN: [id number]  Patient Class: IP- Inpatient  Admission Date: 8/6/2022  Attending Physician: Satnam Barone MD   Primary Care Provider: Primary Doctor No         Patient information was obtained from patient, past medical records and ER records.     Subjective:     Principal Problem:Acute renal failure    Chief Complaint:   Chief Complaint   Patient presents with    Fatigue     Since Tuesday, has not been able to eat or drink since Wednesday, feels weak and dizzy, states he thinks he is dehydrated. was just seen at [facility] yesterday for the same thing.         HPI: Feliz Lopez is a 42 y.o. male with a past medical history of HTN and drug abuse who is presenting for fatigue, lightheadedness, decreased appetite, and decreased urine output. 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 He states he has been in the heat all week and sweating a lot. He began feeling fatigued with decreased appetite on Tuesday and since then has been getting progressively more fatigued and weak with decreased appetite and decreased urine output. He states that he stopped urinating on Thursday. Yesterday patient felt lightheaded with blurred vision and lost consciousness which led to a fall. He denies hitting his head. He reports two syncopal episodes yesterday. He began feeling nauseous yesterday and vomited last night. He reports 3/10 aching mid-low back pain that began yesterday. He went to [facility] yesterday for suture removal and reported decrease urination. No labs were drawn, workup included bladder scan which showed that he was not retaining urine and was discharged home with PCP f/u. He endorses TERRY, weakness, and abdominal pain described as hunger pains.     ED: hypotensive as low at 69/37 and tachypneic. Otherwise VSSAF. EKG with evidence of peaked T waves, K 5.1. Initiated hyperkalemia protocol -  given calcium gluconate, insulin, and NS. Evidence of anion gap metabolic acidosis on VBG. Lactate and CPK netgative. Cr 8.8, baseline ~1.2 in 2020. Leija placed for strict I/O. Nephrology consulted for acute renal failure. Critical care consulted and do not recommend ICU at this time.       Past Medical History:   Diagnosis Date    Hypertension        No past surgical history on file.    Review of patient's allergies indicates:  No Known Allergies    No current facility-administered medications on file prior to encounter.     Current Outpatient Medications on File Prior to Encounter   Medication Sig    lisinopriL (PRINIVIL,ZESTRIL) 40 MG tablet Take 20 mg by mouth.    naloxone (NARCAN) 4 mg/actuation Spry 4mg by nasal route as needed for opioid overdose; may repeat every 2-3 minutes in alternating nostrils until medical help arrives. Call 911    naloxone (NARCAN) 4 mg/actuation Spry 4mg by nasal route as needed for opioid overdose; may repeat every 2-3 minutes in alternating nostrils until medical help arrives. Call 911     Family History    None       Tobacco Use    Smoking status: Current Every Day Smoker    Smokeless tobacco: Never Used   Substance and Sexual Activity    Alcohol use: Yes    Drug use: Yes    Sexual activity: Not on file     Review of Systems   Constitutional:  Positive for activity change, appetite change and fatigue. Negative for chills and fever.   HENT:  Negative for trouble swallowing.    Eyes:  Negative for photophobia and visual disturbance.   Respiratory:  Negative for chest tightness, shortness of breath and wheezing.    Cardiovascular:  Negative for chest pain, palpitations and leg swelling.   Gastrointestinal:  Negative for abdominal pain, constipation, diarrhea, nausea and vomiting.   Genitourinary:  Positive for decreased urine volume and difficulty urinating. Negative for dysuria, frequency, hematuria, penile discharge and urgency.   Musculoskeletal:  Positive for back pain. Negative  for arthralgias and gait problem.   Skin:  Negative for color change and rash.   Neurological:  Positive for dizziness and light-headedness. Negative for syncope, weakness, numbness and headaches.   Psychiatric/Behavioral:  Negative for agitation and confusion. The patient is not nervous/anxious.    Objective:     Vital Signs (Most Recent):  Temp: 98 °F (36.7 °C) (08/06/22 2246)  Pulse: 66 (08/06/22 2246)  Resp: 19 (08/06/22 2246)  BP: (!) 107/53 (08/06/22 2246)  SpO2: 100 % (08/06/22 2246)   Vital Signs (24h Range):  Temp:  [97.9 °F (36.6 °C)-98 °F (36.7 °C)] 98 °F (36.7 °C)  Pulse:  [65-93] 66  Resp:  [12-24] 19  SpO2:  [99 %-100 %] 100 %  BP: ()/(37-58) 107/53     Weight: 63.5 kg (140 lb)  Body mass index is 22.6 kg/m².    Physical Exam  Vitals and nursing note reviewed.   Constitutional:       General: He is not in acute distress.     Appearance: He is well-developed and normal weight. He is ill-appearing.   HENT:      Head: Normocephalic and atraumatic.   Eyes:      Extraocular Movements: Extraocular movements intact.   Cardiovascular:      Rate and Rhythm: Normal rate and regular rhythm.      Heart sounds: Normal heart sounds.   Pulmonary:      Effort: Pulmonary effort is normal. No respiratory distress.      Breath sounds: Normal breath sounds. No wheezing.   Abdominal:      General: Abdomen is flat. Bowel sounds are normal. There is no distension.      Palpations: Abdomen is soft.      Tenderness: There is no abdominal tenderness.   Musculoskeletal:         General: No tenderness. Normal range of motion.      Cervical back: Normal range of motion and neck supple.   Skin:     General: Skin is warm and dry.      Capillary Refill: Capillary refill takes less than 2 seconds.      Findings: No rash.   Neurological:      Mental Status: He is oriented to person, place, and time. He is lethargic.      Cranial Nerves: No cranial nerve deficit.      Sensory: No sensory deficit.      Coordination: Coordination  normal.   Psychiatric:         Attention and Perception: Attention normal.         Mood and Affect: Mood normal.         Speech: Speech normal.         Behavior: Behavior normal. Behavior is cooperative.         Thought Content: Thought content normal.         Judgment: Judgment normal.           Significant Labs: All pertinent labs within the past 24 hours have been reviewed.  CBC:   Recent Labs   Lab 08/06/22  1810 08/06/22 1822 08/06/22 1822   WBC  --  4.97  --    HGB  --  10.5*  --    HCT 31* 30.4* 30*   PLT  --  257  --      CMP:   Recent Labs   Lab 08/06/22 1822 08/06/22 2021   * 135*   K 5.1 4.5    102   CO2 12* 17*   GLU 92 122*   BUN 78* 76*   CREATININE 8.7* 8.8*   CALCIUM 7.9* 8.8   PROT 6.6 7.5   ALBUMIN 3.3* 3.8   BILITOT 0.7 0.7   ALKPHOS 62 70   AST 17 20   ALT 11 15   ANIONGAP 17* 16     Lactic Acid:   Recent Labs   Lab 08/06/22 1822   LACTATE 1.0     POCT Glucose:   Recent Labs   Lab 08/06/22  1804 08/06/22  1932 08/06/22  2030   POCTGLUCOSE 98 276* 99     Urine Studies:   Recent Labs   Lab 08/06/22 1943   COLORU Katherine   APPEARANCEUA Cloudy*   PHUR 5.0   SPECGRAV 1.020   PROTEINUA 2+*   GLUCUA 1+*   KETONESU Trace*   BILIRUBINUA Negative   OCCULTUA 1+*   NITRITE Negative   LEUKOCYTESUR Trace*   RBCUA 23*   WBCUA 43*   BACTERIA Occasional   SQUAMEPITHEL 2   HYALINECASTS 12*       Significant Imaging: I have reviewed all pertinent imaging results/findings within the past 24 hours.  Assessment/Plan:     * Acute renal failure  High anion gap metabolic acidosis  Hyperkalemia    - patient's creatinine today 8.8 and baseline creatinine 1.3 in 2020  - recent Cr of 3.93 in June 2022 when patient presented to the ED with heat exhaustion, declined admission and did not follow-up  - UA cloudy with 12 hyaline casts  - likely due to severe IVVD +/- lisinopril toxicity   - K 5.1 in the ED, peaked T waves seen on initial EKG - patient received calcium gluconate, insulin, and D5W --> K 4.5  - urine  sodium and creatinine ordered  - renal ultrasound ordered  - avoid nephrotoxic insults, will hold lisinpril  - continuous IVF overnight with sodium bicarb 150 mEq in dextrose 5% 1,000 mL @ 125ml/hr   - avina placed, monitor I/Os  - nephrology consulted, appreciate assistance   - continue to monitor BMP, mag, phos and replete lytes if necessary      Hypotension  - BP as low at 69/37 in the ED, likely due to IVVD  - received 2L NS in the ED with some improvement up to 107/53  - will hold lisinopril in setting of hypotension and FREDERICK, recommend discontinuing lisinopril at discharge and introducing a new BP agent  - receiving continuous IVF throughout the night  - monitor closely overnight    VTE Risk Mitigation (From admission, onward)           Ordered     Place sequential compression device  Until discontinued         08/06/22 2246     IP VTE LOW RISK PATIENT  Once         08/06/22 2246                       Yareli Jovel PA-C  Department of Hospital Medicine   [facility] - Emergency Dept

## 2022-08-07 NOTE — HPI
Feliz Lopez is a 42 y.o. male with a past medical history of HTN and drug abuse who is presenting for fatigue, lightheadedness, decreased appetite, and decreased urine output. 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 He states he has been in the heat all week and sweating a lot. He began feeling fatigued with decreased appetite on Tuesday and since then has been getting progressively more fatigued and weak with decreased appetite and decreased urine output. He states that he stopped urinating on Thursday. Yesterday patient felt lightheaded with blurred vision and lost consciousness which led to a fall. He denies hitting his head. He reports two syncopal episodes yesterday. He began feeling nauseous yesterday and vomited last night. He reports 3/10 aching mid-low back pain that began yesterday. He went to [facility] yesterday for suture removal and reported decrease urination. No labs were drawn, workup included bladder scan which showed that he was not retaining urine and was discharged home with PCP f/u. He endorses TERRY, weakness, and abdominal pain described as hunger pains.     ED: hypotensive as low at 69/37 and tachypneic. Otherwise VSSAF. EKG with evidence of peaked T waves, K 5.1. Initiated hyperkalemia protocol - given calcium gluconate, insulin, and NS. Evidence of anion gap metabolic acidosis on VBG. Lactate and CPK netgative. Cr 8.8, baseline ~1.2 in 2020. Leija placed for strict I/O. Nephrology consulted for acute renal failure. Critical care consulted and do not recommend ICU at this time.

## 2022-08-07 NOTE — PLAN OF CARE
Patient in bed resting quietly, no signs of distress noted, breathing even and unlabored, ambulate to restroom without assist, NS infusing to left arm @ 125/hr, tolerating well, will continue to monitor, avina catheter in place, will continue to monitor.    Problem: Infection  Goal: Absence of Infection Signs and Symptoms  Outcome: Ongoing, Progressing  Intervention: Prevent or Manage Infection  Flowsheets (Taken 8/7/2022 1853)  Fever Reduction/Comfort Measures: lightweight bedding  Isolation Precautions:   precautions maintained   contact     Problem: Adult Inpatient Plan of Care  Goal: Optimal Comfort and Wellbeing  Outcome: Ongoing, Progressing  Intervention: Provide Person-Centered Care  Flowsheets (Taken 8/7/2022 1853)  Trust Relationship/Rapport:   care explained   choices provided   emotional support provided

## 2022-08-07 NOTE — CONSULTS
[facility]  Nephrology  Consult Note    Patient Name: Feliz Lopez  MRN: [id number]  Admission Date: 8/6/2022  Hospital Length of Stay: 1 days  Attending Provider: Diana Haskins MD   Primary Care Physician: Primary Doctor No  Principal Problem:Acute renal failure    Inpatient consult to Nephrology  Consult performed by: Alex Lambert MD  Consult ordered by: Becca Og MD        Subjective:     HPI: Feliz Lopez is a 42 y.o. male with a past medical history of HTN and drug abuse who is presenting for fatigue, lightheadedness, decreased appetite, and decreased urine output. 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 He states he has been in the heat all week and sweating a lot. He began feeling fatigued with decreased appetite on Tuesday and since then has been getting progressively more fatigued and weak with decreased appetite and decreased urine output. He states that he stopped urinating on Thursday. Prior to admission the patient felt lightheaded with blurred vision and lost consciousness which led to a fall. He also began feeling nauseous and vomited. He reports 3/10 aching mid-low back pain. He went to [facility] yesterday for suture removal and reported decrease urination. No labs were drawn, workup included bladder scan which showed that he was not retaining urine and was discharged home with PCP f/u. He endorses TERRY, weakness, and abdominal pain described as hunger pains. Initial labs revealed renal failure with creatinine up to 8.8 (baseline around 1.2 back in 2020 however Cr up to 3 upon recent ED visit which could represent CKD or FREDERICK at that time. Pt was started on IVF for volume expansion after which his blood pressure improved. Nephrology consulted for evaluation of acute renal failure.           Past Medical History:   Diagnosis Date    Hypertension        No past surgical history on file.    Review of patient's allergies indicates:  No Known  Allergies  Current Facility-Administered Medications   Medication Frequency    acetaminophen tablet 1,000 mg Q8H PRN    acetaminophen tablet 650 mg Q4H PRN    albuterol-ipratropium 2.5 mg-0.5 mg/3 mL nebulizer solution 3 mL Q4H PRN    aluminum-magnesium hydroxide-simethicone 200-200-20 mg/5 mL suspension 30 mL QID PRN    bisacodyL suppository 10 mg Daily PRN    calcium gluconate 1 g in NS IVPB (premixed) Q10 Min PRN    dextrose 10% bolus 125 mL PRN    dextrose 10% bolus 250 mL PRN    dextrose 10% bolus 250 mL PRN    glucagon (human recombinant) injection 1 mg PRN    glucose chewable tablet 16 g PRN    glucose chewable tablet 24 g PRN    melatonin tablet 6 mg Nightly PRN    mupirocin 2 % ointment BID    naloxone 0.4 mg/mL injection 0.02 mg PRN    ondansetron disintegrating tablet 8 mg Q8H PRN    polyethylene glycol packet 17 g Daily    prochlorperazine injection Soln 5 mg Q6H PRN    simethicone chewable tablet 80 mg QID PRN    sodium bicarbonate 150 mEq in dextrose 5 % 1,000 mL infusion Continuous    sodium chloride 0.9% flush 10 mL PRN    sodium chloride 0.9% flush 5 mL PRN     Family History    None       Tobacco Use    Smoking status: Current Every Day Smoker    Smokeless tobacco: Never Used   Substance and Sexual Activity    Alcohol use: Yes    Drug use: Yes    Sexual activity: Not on file     Review of Systems   Constitutional:  Positive for activity change, appetite change and fatigue. Negative for fever.   Cardiovascular:  Negative for chest pain and leg swelling.   Gastrointestinal:  Negative for abdominal pain, diarrhea, nausea and vomiting.   Genitourinary:  Positive for decreased urine volume. Negative for dysuria, enuresis and flank pain.   Neurological:  Positive for dizziness.   Objective:     Vital Signs (Most Recent):  Temp: 98.1 °F (36.7 °C) (08/07/22 0530)  Pulse: 64 (08/07/22 0530)  Resp: 18 (08/07/22 0530)  BP: 129/60 (08/07/22 0530)  SpO2: 100 % (08/07/22 0530)  O2  Device (Oxygen Therapy): room air (08/07/22 0416) Vital Signs (24h Range):  Temp:  [97.9 °F (36.6 °C)-98.1 °F (36.7 °C)] 98.1 °F (36.7 °C)  Pulse:  [64-93] 64  Resp:  [12-24] 18  SpO2:  [99 %-100 %] 100 %  BP: ()/(37-60) 129/60     Weight: 64.6 kg (142 lb 6.7 oz) (08/07/22 0530)  Body mass index is 22.99 kg/m².  Body surface area is 1.73 meters squared.    I/O last 3 completed shifts:  In: -   Out: 200 [Urine:200]    Physical Exam  Constitutional:       General: He is not in acute distress.     Appearance: He is ill-appearing.   HENT:      Head: Normocephalic and atraumatic.      Mouth/Throat:      Mouth: Mucous membranes are dry.   Eyes:      Pupils: Pupils are equal, round, and reactive to light.   Cardiovascular:      Rate and Rhythm: Normal rate.   Pulmonary:      Effort: Pulmonary effort is normal. No respiratory distress.   Abdominal:      General: There is no distension.      Palpations: Abdomen is soft.   Musculoskeletal:         General: No swelling. Normal range of motion.      Right lower leg: No edema.      Left lower leg: No edema.   Skin:     General: Skin is warm and dry.      Coloration: Skin is not jaundiced.   Neurological:      Mental Status: He is oriented to person, place, and time.       Significant Labs:  CBC:   Recent Labs   Lab 08/07/22  0442   WBC 6.86   RBC 3.74*   HGB 11.0*   HCT 32.0*      MCV 86   MCH 29.4   MCHC 34.4     CMP:   Recent Labs   Lab 08/07/22  0442   *   CALCIUM 8.7   ALBUMIN 3.5   PROT 6.7   *   K 4.7   CO2 22*   CL 99   BUN 79*   CREATININE 7.5*   ALKPHOS 62   ALT 14   AST 20   BILITOT 0.7         Assessment/Plan:     * Acute renal failure  Oliguric FREDERICK, likely ATN in the setting of hypotension and decreased fluid intake   Baseline serum Cr unclear; at 1.3 back in 2020 however recent ED visit back in June with Cr at 3.9   Serum Cr at 8.8 on admission; down to 7.5 this AM   Urine sodium at 82   Anion gap metabolic acidosis likely due to renal  failure  Recommend to continue volume expansion with isotonic bicarb but to decrease rate to 100cc/h for now  Obtain retroperitoneal US   Will get urine for microscopy   Keep avina for strict I/Os    RFP q12h for now    Keep holding ACEi for now   Dose medications to GFR          Thank you for your consult. I will follow-up with patient. Please contact us if you have any additional questions.    Alex Lambert MD  Nephrology  [facility]

## 2022-08-07 NOTE — HPI
Feliz Lopez is a 42 y.o. male with a past medical history of HTN and drug abuse who is presenting for fatigue, lightheadedness, decreased appetite, and decreased urine output. 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 He states he has been in the heat all week and sweating a lot. He began feeling fatigued with decreased appetite on Tuesday and since then has been getting progressively more fatigued and weak with decreased appetite and decreased urine output. He states that he stopped urinating on Thursday. Prior to admission the patient felt lightheaded with blurred vision and lost consciousness which led to a fall. He also began feeling nauseous and vomited. He reports 3/10 aching mid-low back pain. He went to [facility] yesterday for suture removal and reported decrease urination. No labs were drawn, workup included bladder scan which showed that he was not retaining urine and was discharged home with PCP f/u. He endorses TERRY, weakness, and abdominal pain described as hunger pains. Initial labs revealed renal failure with creatinine up to 8.8 (baseline around 1.2 back in 2020 however Cr up to 3 upon recent ED visit which could represent CKD or FREDERICK at that time. Pt was started on IVF for volume expansion after which his blood pressure improved. Nephrology consulted for evaluation of acute renal failure.

## 2022-08-07 NOTE — ASSESSMENT & PLAN NOTE
High anion gap metabolic acidosis  Hyperkalemia    - patient's creatinine today 8.8 and baseline creatinine 1.3 in 2020  - recent Cr of 3.93 in June 2022 when patient presented to the ED with heat exhaustion, declined admission and did not follow-up  - UA cloudy with 12 hyaline casts  - likely due to severe IVVD +/- lisinopril toxicity   - K 5.1 in the ED, peaked T waves seen on initial EKG - patient received calcium gluconate, insulin, and D5W --> K 4.5  - urine sodium and creatinine ordered  - renal ultrasound ordered  - avoid nephrotoxic insults, will hold lisinpril  - continuous IVF overnight with sodium bicarb 150 mEq in dextrose 5% 1,000 mL @ 125ml/hr   - avina placed, monitor I/Os  - nephrology consulted, appreciate assistance   - continue to monitor BMP, mag, phos and replete lytes if necessary

## 2022-08-07 NOTE — PHARMACY MED REC
"Admission Medication History     The home medication history was taken by Romina Isaacs.    You may go to "Admission" then "Reconcile Home Medications" tabs to review and/or act upon these items.      The home medication list has been updated by the Pharmacy department.    Please read ALL comments highlighted in yellow.    Please address this information as you see fit.     Feel free to contact us if you have any questions or require assistance.        Medications listed below were obtained from: Patient/family  PTA Medications   Medication Sig    acetaminophen (TYLENOL) 500 MG tablet   Take 1,000 mg by mouth daily as needed for Pain.    calcium carbonate-magnesium hydroxide (ROLAIDS) 550-110 mg Chew   Take 2-3 tablets by mouth 2 (two) times daily as needed (acid reflux).    lisinopriL (PRINIVIL,ZESTRIL) 40 MG tablet   Take 20 mg by mouth once daily.    sulfamethoxazole-trimethoprim 800-160mg (BACTRIM DS) 800-160 mg Tab   Take 1 tablet by mouth 2 (two) times daily. For 10 days    traMADoL (ULTRAM) 50 mg tablet   Take 50 mg by mouth every 6 (six) hours as needed for Pain.    naloxone (NARCAN) 4 mg/actuation Spry     4mg by nasal route as needed for opioid overdose; may repeat every 2-3 minutes in alternating nostrils until medical help arrives. Call 911    naloxone (NARCAN) 4 mg/actuation Spry 4mg by nasal route as needed for opioid overdose; may repeat every 2-3 minutes in alternating nostrils until medical help arrives. Call 911         Romina Isaacs  EXT [phone]                  .          "

## 2022-08-07 NOTE — CONSULTS
Pulm/CC Fellow Consult Note    Attending Physician: Bishop Ahumada Jr., MD    Date of Admit: 8/6/2022    Reason for Consult: hypotension    History of Present Illness:  Feliz Lopez is a 42 y.o.  male with a PMHx of HTN presenting to the ED with fatigue, lightheadedness, decreased PO intake and decreased urine output. Patient states he has had difficulty urinating for almost a year now, but the last three days, he has only been able to get out drops at a time. Endorses drinking plenty of water. Works cutting grass during the day. Over the last 3 days, patient has felt increasingly fatigued and had three episodes of feeling lightheaded (vision going black) with one true syncopal event. Denies prodrome to syncope, CP, or SOB.     On chart review, patient was admitted in June of this year for heat exhaustion and found to have a creatinine of 3.94 that was never followed up.     Past Medical History:  Past Medical History:   Diagnosis Date    Hypertension        Past Surgical History:  No past surgical history on file.    Allergies:  Review of patient's allergies indicates:  No Known Allergies    Family History:  No family history on file.    Social History:  Social History     Tobacco Use    Smoking status: Current Every Day Smoker    Smokeless tobacco: Never Used   Substance Use Topics    Alcohol use: Yes    Drug use: Yes       Review of Systems:  Review of Systems   Constitutional: Negative for chills and fever.   HENT: Negative for congestion and sore throat.    Eyes: Negative for blurred vision and photophobia.   Respiratory: Negative for cough.    Cardiovascular: Negative for chest pain, palpitations and leg swelling.   Gastrointestinal: Positive for constipation. Negative for diarrhea, nausea and vomiting.   Genitourinary: Positive for frequency. Negative for dysuria.   Musculoskeletal: Negative for back pain.   Skin: Negative for rash.   Neurological:        Syncope   Endo/Heme/Allergies: Negative  "for polydipsia.   Psychiatric/Behavioral: Negative for depression.        Objective:   Last 24 Hour Vital Signs:  BP  Min: 69/37  Max: 117/58  Temp  Av.9 °F (36.6 °C)  Min: 97.9 °F (36.6 °C)  Max: 97.9 °F (36.6 °C)  Pulse  Av  Min: 65  Max: 93  Resp  Av.9  Min: 12  Max: 24  SpO2  Av.9 %  Min: 99 %  Max: 100 %  Height  Av' 6" (167.6 cm)  Min: 5' 6" (167.6 cm)  Max: 5' 6" (167.6 cm)  Weight  Av.5 kg (140 lb)  Min: 63.5 kg (140 lb)  Max: 63.5 kg (140 lb)  Body mass index is 22.6 kg/m².  No intake/output data recorded.    Physical Exam:  General: Alert and awake in NAD  HENT:  NCAT; anicteric sclera; OP clear with dry mucous membranes  Cardio:  Regular rate and rhythm with normal S1 and S2; no murmurs or rubs  Resp:  CTAB; respirations unlabored; no wheezes, crackles or rhonchi  Abdom: Soft, Non-tender  Extrem: WWP with no clubbing, cyanosis or edema  Pulses: 2+ and symmetric distally  Neuro:  AAOx3; cooperative and pleasant       Assessment & Plan:   42 year old male presenting to ED with fatigue, decreased urine output and syncope consulted to the ICU for hypotension with normal mentation.     - FREDERICK  Given previous elevations in Creatinine with no follow up, may have some degree of CKD. Likely prerenal contributing in the setting of dehydration. Making urine currently with volume repletion. Does not appear to have acute need for dialysis at this time.   - Recommend urine studies     - Syncope  - Hypotension   On presentation, patient with severe FREDERICK. Would consider lisinopril toxicity vs dehydration. On Lisinopril which he takes everyday. Lisinopril renally excreted in active metabolite form. BP transiently improved with fluids.    - Recommend continuous fluids and observation     Patient does not appear to need ICU care at this point. Please reach out with any further questions or concerns.     Mona Salvador  Critical Care Medicine  "

## 2022-08-08 VITALS
HEIGHT: 66 IN | HEART RATE: 68 BPM | TEMPERATURE: 98 F | OXYGEN SATURATION: 98 % | BODY MASS INDEX: 22.89 KG/M2 | WEIGHT: 142.44 LBS | DIASTOLIC BLOOD PRESSURE: 58 MMHG | SYSTOLIC BLOOD PRESSURE: 108 MMHG | RESPIRATION RATE: 18 BRPM

## 2022-08-08 LAB
ALBUMIN SERPL BCP-MCNC: 2.9 G/DL (ref 3.5–5.2)
ALP SERPL-CCNC: 51 U/L (ref 55–135)
ALT SERPL W/O P-5'-P-CCNC: 13 U/L (ref 10–44)
ANION GAP SERPL CALC-SCNC: 5 MMOL/L (ref 8–16)
AST SERPL-CCNC: 18 U/L (ref 10–40)
BACTERIA UR CULT: NO GROWTH
BASOPHILS # BLD AUTO: 0.02 K/UL (ref 0–0.2)
BASOPHILS NFR BLD: 0.5 % (ref 0–1.9)
BILIRUB SERPL-MCNC: 0.4 MG/DL (ref 0.1–1)
BUN SERPL-MCNC: 56 MG/DL (ref 6–20)
CALCIUM SERPL-MCNC: 8.3 MG/DL (ref 8.7–10.5)
CHLORIDE SERPL-SCNC: 106 MMOL/L (ref 95–110)
CO2 SERPL-SCNC: 25 MMOL/L (ref 23–29)
CREAT SERPL-MCNC: 2.9 MG/DL (ref 0.5–1.4)
DIFFERENTIAL METHOD: ABNORMAL
EOSINOPHIL # BLD AUTO: 0 K/UL (ref 0–0.5)
EOSINOPHIL NFR BLD: 1 % (ref 0–8)
ERYTHROCYTE [DISTWIDTH] IN BLOOD BY AUTOMATED COUNT: 13.8 % (ref 11.5–14.5)
EST. GFR  (NO RACE VARIABLE): 26.7 ML/MIN/1.73 M^2
GLUCOSE SERPL-MCNC: 100 MG/DL (ref 70–110)
HCT VFR BLD AUTO: 29.8 % (ref 40–54)
HCV AB SERPL QL IA: POSITIVE
HGB BLD-MCNC: 9.9 G/DL (ref 14–18)
HIV 1+2 AB+HIV1 P24 AG SERPL QL IA: NEGATIVE
IMM GRANULOCYTES # BLD AUTO: 0.01 K/UL (ref 0–0.04)
IMM GRANULOCYTES NFR BLD AUTO: 0.2 % (ref 0–0.5)
LYMPHOCYTES # BLD AUTO: 1.5 K/UL (ref 1–4.8)
LYMPHOCYTES NFR BLD: 35.1 % (ref 18–48)
MAGNESIUM SERPL-MCNC: 2.1 MG/DL (ref 1.6–2.6)
MCH RBC QN AUTO: 29.1 PG (ref 27–31)
MCHC RBC AUTO-ENTMCNC: 33.2 G/DL (ref 32–36)
MCV RBC AUTO: 88 FL (ref 82–98)
MONOCYTES # BLD AUTO: 0.4 K/UL (ref 0.3–1)
MONOCYTES NFR BLD: 9.9 % (ref 4–15)
NEUTROPHILS # BLD AUTO: 2.2 K/UL (ref 1.8–7.7)
NEUTROPHILS NFR BLD: 53.3 % (ref 38–73)
NRBC BLD-RTO: 0 /100 WBC
PHOSPHATE SERPL-MCNC: 2.8 MG/DL (ref 2.7–4.5)
PLATELET # BLD AUTO: 241 K/UL (ref 150–450)
PMV BLD AUTO: 11.2 FL (ref 9.2–12.9)
POCT GLUCOSE: 98 MG/DL (ref 70–110)
POCT GLUCOSE: 99 MG/DL (ref 70–110)
POTASSIUM SERPL-SCNC: 4.7 MMOL/L (ref 3.5–5.1)
PROT SERPL-MCNC: 5.8 G/DL (ref 6–8.4)
RBC # BLD AUTO: 3.4 M/UL (ref 4.6–6.2)
SODIUM SERPL-SCNC: 136 MMOL/L (ref 136–145)
WBC # BLD AUTO: 4.13 K/UL (ref 3.9–12.7)

## 2022-08-08 PROCEDURE — 99239 PR HOSPITAL DISCHARGE DAY,>30 MIN: ICD-10-PCS | Mod: ,,, | Performed by: HOSPITALIST

## 2022-08-08 PROCEDURE — 85025 COMPLETE CBC W/AUTO DIFF WBC: CPT | Performed by: STUDENT IN AN ORGANIZED HEALTH CARE EDUCATION/TRAINING PROGRAM

## 2022-08-08 PROCEDURE — 36415 COLL VENOUS BLD VENIPUNCTURE: CPT | Performed by: STUDENT IN AN ORGANIZED HEALTH CARE EDUCATION/TRAINING PROGRAM

## 2022-08-08 PROCEDURE — 99239 HOSP IP/OBS DSCHRG MGMT >30: CPT | Mod: ,,, | Performed by: HOSPITALIST

## 2022-08-08 PROCEDURE — 83735 ASSAY OF MAGNESIUM: CPT | Performed by: STUDENT IN AN ORGANIZED HEALTH CARE EDUCATION/TRAINING PROGRAM

## 2022-08-08 PROCEDURE — 84100 ASSAY OF PHOSPHORUS: CPT | Performed by: STUDENT IN AN ORGANIZED HEALTH CARE EDUCATION/TRAINING PROGRAM

## 2022-08-08 PROCEDURE — 25000003 PHARM REV CODE 250

## 2022-08-08 PROCEDURE — 94761 N-INVAS EAR/PLS OXIMETRY MLT: CPT

## 2022-08-08 PROCEDURE — 80053 COMPREHEN METABOLIC PANEL: CPT | Performed by: STUDENT IN AN ORGANIZED HEALTH CARE EDUCATION/TRAINING PROGRAM

## 2022-08-08 RX ORDER — LISINOPRIL 40 MG/1
20 TABLET ORAL DAILY
Qty: 45 TABLET | Refills: 3 | Status: SHIPPED | OUTPATIENT
Start: 2022-08-08 | End: 2023-08-08

## 2022-08-08 RX ADMIN — SODIUM BICARBONATE 650 MG TABLET 650 MG: at 08:08

## 2022-08-08 RX ADMIN — MUPIROCIN: 20 OINTMENT TOPICAL at 08:08

## 2022-08-08 RX ADMIN — SODIUM CHLORIDE 500 ML: 0.9 INJECTION, SOLUTION INTRAVENOUS at 10:08

## 2022-08-08 NOTE — PLAN OF CARE
Patient received dc instructions and informed to stop taking lisinopril until getting with his PCP as noted on AVS packet. Patient verbalizes understanding. Leija DC prior to dc per md order. IV removed and patient left unit via self, to ride home with father. No significant changes on shift.

## 2022-08-08 NOTE — PLAN OF CARE
Problem: Infection  Goal: Absence of Infection Signs and Symptoms  Outcome: Ongoing, Progressing     Problem: Adult Inpatient Plan of Care  Goal: Plan of Care Review  Outcome: Ongoing, Progressing  Goal: Patient-Specific Goal (Individualized)  Outcome: Ongoing, Progressing  Goal: Absence of Hospital-Acquired Illness or Injury  Outcome: Ongoing, Progressing  Goal: Optimal Comfort and Wellbeing  Outcome: Ongoing, Progressing  Goal: Readiness for Transition of Care  Outcome: Ongoing, Progressing     Problem: Fluid and Electrolyte Imbalance (Acute Kidney Injury/Impairment)  Goal: Fluid and Electrolyte Balance  Outcome: Ongoing, Progressing     Problem: Oral Intake Inadequate (Acute Kidney Injury/Impairment)  Goal: Optimal Nutrition Intake  Outcome: Ongoing, Progressing     Problem: Renal Function Impairment (Acute Kidney Injury/Impairment)  Goal: Effective Renal Function  Outcome: Ongoing, Progressing    Pt in bed at beginning of shift. Father at bedside. AAOx4, RA, no SOB, pain, or discomfort reported. Discussed importance of CHG wipes to prevent CAUTI. Administered HS meds and Q2H rounding practiced throughout the night.

## 2022-08-08 NOTE — HOSPITAL COURSE
Patient admitted for FREDERICK/ARF 2/2 severe pre-renal etiology (works outside and poor PO intake). Aggressively resuscitated with IVF bolus and maintenance fluids with excellent response (sCr up to 9s down to 2s within 24 hours). Started on CTX empirically given UA, but Ucx negative for growth, so quickly discontinued. BCX NGTD.    Discharge in stable condition to home w/o post-acute needs. PCP, Nephrology referrals. BMP in 1 week.

## 2022-08-08 NOTE — PLAN OF CARE
[facility]  Discharge Final Note    Primary Care Provider: Primary Doctor No    Expected Discharge Date: 8/8/2022    Final Discharge Note (most recent)     Final Note - 08/08/22 1511        Final Note    Assessment Type Final Discharge Note     Anticipated Discharge Disposition Home or Self Care     Hospital Resources/Appts/Education Provided Appointments scheduled and added to AVS        Post-Acute Status    Post-Acute Authorization Other     Other Status See Comments   Client was provided resources for SA, Homeless, and first [facility]    Discharge Delays None known at this time                 Important Message from Medicare             Contact Info     LORI Hobbs   Specialty: Family Medicine    [facility]  [address] [id number]   Phone: [phone]       Next Steps: Follow up on 8/19/2022    Instructions: hospital follow up @ 8:15 am on Aug 19th with Ciara Lang          SW provided pt with resources for housing, SA treatment, and [facility] program to assist with available resources.      Rosibel newton/ Mary sent message to Nephrology team and placed pt on waiting list to get an earlier appointment.      Gwendolyn Florence LMSW  PRN-  [facility]  Ext. [phone]

## 2022-08-08 NOTE — PLAN OF CARE
[facility]  Initial Discharge Assessment       Primary Care Provider: Primary Doctor No    Admission Diagnosis: Hyperkalemia [E87.5]  Fatigue [R53.83]  FREDERICK (acute kidney injury) [N17.9]  Chest pain [R07.9]    Admission Date: 8/6/2022  Expected Discharge Date: 8/8/2022    Discharge Barriers Identified: None    Payor: MEDICAID / Plan: [facility] COMMUNITY PLAN [facility] [employer] ([place] MEDICAID) / Product Type: Managed Medicaid /     Extended Emergency Contact Information  Primary Emergency Contact: Davonte Lopez  Address: [address]           [place] [address] [facility]  Home Phone: [phone]  Relation: Father    Discharge Plan A: Home  Discharge Plan B: Home, Home with family      [id number] [place] - [address]  [address]  [place] [address]  Phone: [phone] Fax: [phone]      Initial Assessment (most recent)     Adult Discharge Assessment - 08/08/22 1112        Discharge Assessment    Assessment Type Discharge Planning Assessment     Confirmed/corrected address, phone number and insurance Yes     Confirmed Demographics Correct on Facesheet     Source of Information patient;health record     When was your last doctors appointment? --   Pt will need to establish care with a new PCP.    Communicated ANNAMARIA with patient/caregiver Yes     Reason For Admission Acute Renal Failure.     Lives With parent(s)     Do you expect to return to your current living situation? Yes     Do you have help at home or someone to help you manage your care at home? Yes     Who are your caregiver(s) and their phone number(s)? Father-Davonte Lopez [phone]     Current cognitive status: Alert/Oriented     Walking or Climbing Stairs Difficulty none     Dressing/Bathing Difficulty none     Readmission within 30 days? Yes     Do you take prescription medications? Yes     Who is going to help you get home at discharge? Father will provide transportation home.     How do you get to doctors  appointments? car, drives self;family or friend will provide     Are you on dialysis? No     Do you take coumadin? No     Discharge Plan A Home     Discharge Plan B Home;Home with family     DME Needed Upon Discharge  none     Discharge Plan discussed with: Patient     Discharge Barriers Identified None        Relationship/Environment    Name(s) of Who Lives With Patient Father-Feliz Lopez               CARMINE met with pt to complete initial discharge assessment.  Pt has hospital readmissions.  Pt will need a new PCP established.  Deanne will work on scheduling PCP appointment.  Pt's father will provide transportation home.    Pt reported he moved back home with his parents.  Client lives in a [facility], no steps for entry.  Client reported he does not use any assistive DME.  Client is able to ambulate and complete his ADLS independently.  Client has family at home to help provide help and support.      Client reported he does not receive coumadin or dialysis.  Client reported he does not receive behavioral health services at this time.  No  services.      CARMINE spoke with client about outside resources for housing, SA, and [place] [address] program.  CARMINE provided pt with contact information for the above services.      Gwendolyn Florence LMSW  PRN-  [facility]  Ext. [phone]

## 2022-08-08 NOTE — CARE UPDATE
FREDERICK resolving   Unclear what his cr baseline is   OPT follow up with nephrology   Will sign off recall prn   Discussed with Shy

## 2022-08-08 NOTE — DISCHARGE SUMMARY
[facility] Medicine  Discharge Summary      Patient Name: Feliz Lopez  MRN: [id number]  Patient Class: IP- Inpatient  Admission Date: 8/6/2022  Hospital Length of Stay: 2 days  Discharge Date and Time:  08/08/2022 12:15 PM  Attending Physician: Diana Haskins MD   Discharging Provider: Evaristo Palacios MD  Primary Care Provider: Primary Doctor [facility] Medicine Team: [facility] 4 Evaristo Palacios MD    HPI:   Feliz Lopez is a 42 y.o. male with a past medical history of HTN and drug abuse who is presenting for fatigue, lightheadedness, decreased appetite, and decreased urine output. 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 He states he has been in the heat all week and sweating a lot. He began feeling fatigued with decreased appetite on Tuesday and since then has been getting progressively more fatigued and weak with decreased appetite and decreased urine output. He states that he stopped urinating on Thursday. Yesterday patient felt lightheaded with blurred vision and lost consciousness which led to a fall. He denies hitting his head. He reports two syncopal episodes yesterday. He began feeling nauseous yesterday and vomited last night. He reports 3/10 aching mid-low back pain that began yesterday. He went to [facility] yesterday for suture removal and reported decrease urination. No labs were drawn, workup included bladder scan which showed that he was not retaining urine and was discharged home with PCP f/u. He endorses TERRY, weakness, and abdominal pain described as hunger pains.     ED: hypotensive as low at 69/37 and tachypneic. Otherwise VSSAF. EKG with evidence of peaked T waves, K 5.1. Initiated hyperkalemia protocol - given calcium gluconate, insulin, and NS. Evidence of anion gap metabolic acidosis on VBG. Lactate and CPK netgative. Cr 8.8, baseline ~1.2 in 2020. Leija placed for strict I/O. Nephrology consulted for acute renal failure. Critical care consulted  and do not recommend ICU at this time.       * No surgery found *      Hospital Course:   Patient admitted for FREDERICK/ARF 2/2 severe pre-renal etiology (works outside and poor PO intake). Aggressively resuscitated with IVF bolus and maintenance fluids with excellent response (sCr up to 9s down to 2s within 24 hours). Started on CTX empirically given UA, but Ucx negative for growth, so quickly discontinued. BCX NGTD.    Discharge in stable condition to home w/o post-acute needs. PCP, Nephrology referrals. BMP in 1 week.       Goals of Care Treatment Preferences:  Code Status: Full Code      Vitals:    08/08/22 0719 08/08/22 0752 08/08/22 1008 08/08/22 1137   BP:  (!) 98/54  (!) 93/52   BP Location:       Patient Position:       Pulse: (!) 58 (!) 55 (!) 59 67   Resp:  18 18 18   Temp:  98.3 °F (36.8 °C)  98.3 °F (36.8 °C)   TempSrc:       SpO2:  100% 98% 98%   Weight:       Height:         Physical Exam  Constitutional:       General: He is not in acute distress.     Appearance: Normal appearance.   HENT:      Head: Normocephalic.      Nose: Nose normal.      Mouth/Throat:      Mouth: Mucous membranes are moist.      Pharynx: Oropharynx is clear.   Eyes:      Extraocular Movements: Extraocular movements intact.   Cardiovascular:      Rate and Rhythm: Normal rate and regular rhythm.      Pulses: Normal pulses.      Heart sounds: Normal heart sounds. No murmur heard.  Pulmonary:      Effort: Pulmonary effort is normal. No respiratory distress.      Breath sounds: Normal breath sounds. No wheezing.   Abdominal:      General: Abdomen is flat. There is no distension.      Palpations: Abdomen is soft. There is no mass.      Tenderness: There is no abdominal tenderness.   Musculoskeletal:         General: No swelling or tenderness. Normal range of motion.      Cervical back: Normal range of motion.   Skin:     General: Skin is warm and dry.      Comments: Multiple tattoos on arms   Neurological:      General: No focal deficit  present.      Mental Status: He is alert and oriented to person, place, and time. Mental status is at baseline.   Psychiatric:         Mood and Affect: Mood normal.         Behavior: Behavior normal.         Thought Content: Thought content normal.             Consults:   Consults (From admission, onward)        Status Ordering Provider     Inpatient consult to Critical Care Medicine  Once        Provider:  (Not yet assigned)    Completed ERMA IGNACIO     Inpatient consult to Nephrology  Once        Provider:  (Not yet assigned)    Completed ERMA IGNACIO          No new Assessment & Plan notes have been filed under this hospital service since the last note was generated.  Service: Hospital Medicine    Final Active Diagnoses:    Diagnosis Date Noted POA    PRINCIPAL PROBLEM:  Acute renal failure [N17.9] 08/07/2022 Yes    High anion gap metabolic acidosis [E87.2] 08/07/2022 Yes    Hyperkalemia [E87.5] 08/07/2022 Yes    Hypotension [I95.9] 08/07/2022 Yes      Problems Resolved During this Admission:       Discharged Condition: stable    Disposition: Home or Self Care    Follow Up:   Follow-up Information     LORI Hobbs Follow up on 8/19/2022.    Specialty: Family Medicine  Why: hospital follow up @ 8:15 am on Aug 19th with Ciara Lang  Contact information:  [address] [facility] [id number]  [phone]                       Patient Instructions:      BASIC METABOLIC PANEL   Standing Status: Future Standing Exp. Date: 10/07/23     Ambulatory referral/consult to Internal Medicine   Standing Status: Future   Referral Priority: Routine Referral Type: Consultation   Referral Reason: Specialty Services Required   Requested Specialty: Internal Medicine   Number of Visits Requested: 1     Ambulatory referral/consult to Nephrology   Standing Status: Future   Referral Priority: Routine Referral Type: Consultation   Referral Reason: Specialty Services Required   Requested Specialty: Nephrology    Number of Visits Requested: 1       Significant Diagnostic Studies: Labs:   CMP   Recent Labs   Lab 08/06/22 2021 08/07/22 0442 08/08/22  0504   * 132* 136   K 4.5 4.7 4.7    99 106   CO2 17* 22* 25   * 111* 100   BUN 76* 79* 56*   CREATININE 8.8* 7.5* 2.9*   CALCIUM 8.8 8.7 8.3*   PROT 7.5 6.7 5.8*   ALBUMIN 3.8 3.5 2.9*   BILITOT 0.7 0.7 0.4   ALKPHOS 70 62 51*   AST 20 20 18   ALT 15 14 13   ANIONGAP 16 11 5*    and CBC   Recent Labs   Lab 08/06/22 1822 08/06/22 1822 08/07/22 0442 08/08/22  0504   WBC 4.97  --  6.86 4.13   HGB 10.5*  --  11.0* 9.9*   HCT 30.4*   < > 32.0* 29.8*     --  253 241    < > = values in this interval not displayed.       Pending Diagnostic Studies:     Procedure Component Value Units Date/Time    HIV 1/2 Ag/Ab (4th Gen) [[id number]] Collected: 08/06/22 1822    Order Status: Sent Lab Status: In process Updated: 08/06/22 1823    Specimen: Blood     Hepatitis C Antibody [[id number]] Collected: 08/06/22 1822    Order Status: Sent Lab Status: In process Updated: 08/06/22 1823    Specimen: Blood          Medications:  Reconciled Home Medications:      Medication List      CHANGE how you take these medications    lisinopriL 40 MG tablet  Commonly known as: PRINIVIL,ZESTRIL  Take 0.5 tablets (20 mg total) by mouth once daily. Please do NOT take until you see your PCP  What changed: additional instructions     naloxone 4 mg/actuation Spry  Commonly known as: NARCAN  4mg by nasal route as needed for opioid overdose; may repeat every 2-3 minutes in alternating nostrils until medical help arrives. Call 911  What changed: Another medication with the same name was removed. Continue taking this medication, and follow the directions you see here.        CONTINUE taking these medications    acetaminophen 500 MG tablet  Commonly known as: TYLENOL  Take 1,000 mg by mouth daily as needed for Pain.     calcium carbonate-magnesium hydroxide 550-110 mg Chew  Commonly known as:  ROLAIDS  Take 2-3 tablets by mouth 2 (two) times daily as needed (acid reflux).     sulfamethoxazole-trimethoprim 800-160mg 800-160 mg Tab  Commonly known as: BACTRIM DS  Take 1 tablet by mouth 2 (two) times daily. For 10 days     traMADoL 50 mg tablet  Commonly known as: ULTRAM  Take 50 mg by mouth every 6 (six) hours as needed for Pain.            Indwelling Lines/Drains at time of discharge:   Lines/Drains/Airways     Drain  Duration                Urethral Catheter 08/06/22 1934 Coude 16 Fr. 1 day                Time spent on the discharge of patient: 45   minutes         Da-Clifford Palacios MD  Department of Hospital Medicine  [facility] Surg

## 2022-08-10 ENCOUNTER — TELEPHONE (OUTPATIENT)
Dept: NEPHROLOGY | Facility: CLINIC | Age: 42
End: 2022-08-10
Payer: MEDICAID

## 2022-08-10 ENCOUNTER — PATIENT OUTREACH (OUTPATIENT)
Dept: ADMINISTRATIVE | Facility: CLINIC | Age: 42
End: 2022-08-10
Payer: MEDICAID

## 2022-08-10 ENCOUNTER — PATIENT MESSAGE (OUTPATIENT)
Dept: ADMINISTRATIVE | Facility: CLINIC | Age: 42
End: 2022-08-10
Payer: MEDICAID

## 2022-08-10 NOTE — PROGRESS NOTES
C3 nurse attempted to contact Feliz Lopez for a TCC post hospital discharge follow up call. No answer. Left voicemail with callback information. The patient does not have a scheduled HOSFU appointment. NON-[facility] PCP

## 2022-08-11 ENCOUNTER — OFFICE VISIT (OUTPATIENT)
Dept: NEPHROLOGY | Facility: CLINIC | Age: 42
End: 2022-08-11
Payer: MEDICAID

## 2022-08-11 ENCOUNTER — LAB VISIT (OUTPATIENT)
Dept: LAB | Facility: HOSPITAL | Age: 42
End: 2022-08-11
Payer: MEDICAID

## 2022-08-11 VITALS
SYSTOLIC BLOOD PRESSURE: 142 MMHG | BODY MASS INDEX: 23.84 KG/M2 | HEART RATE: 92 BPM | DIASTOLIC BLOOD PRESSURE: 84 MMHG | WEIGHT: 147.69 LBS | OXYGEN SATURATION: 99 %

## 2022-08-11 DIAGNOSIS — N17.9 ACUTE RENAL FAILURE, UNSPECIFIED ACUTE RENAL FAILURE TYPE: ICD-10-CM

## 2022-08-11 DIAGNOSIS — N17.9 AKI (ACUTE KIDNEY INJURY): ICD-10-CM

## 2022-08-11 LAB
ALBUMIN SERPL BCP-MCNC: 3.7 G/DL (ref 3.5–5.2)
ANION GAP SERPL CALC-SCNC: 10 MMOL/L (ref 8–16)
BACTERIA BLD CULT: NORMAL
BACTERIA BLD CULT: NORMAL
BUN SERPL-MCNC: 20 MG/DL (ref 6–20)
C3 SERPL-MCNC: 94 MG/DL (ref 50–180)
C4 SERPL-MCNC: 23 MG/DL (ref 11–44)
CALCIUM SERPL-MCNC: 9.2 MG/DL (ref 8.7–10.5)
CHLORIDE SERPL-SCNC: 106 MMOL/L (ref 95–110)
CO2 SERPL-SCNC: 22 MMOL/L (ref 23–29)
CREAT SERPL-MCNC: 1.1 MG/DL (ref 0.5–1.4)
EST. GFR  (NO RACE VARIABLE): >60 ML/MIN/1.73 M^2
GLUCOSE SERPL-MCNC: 84 MG/DL (ref 70–110)
PHOSPHATE SERPL-MCNC: 3.4 MG/DL (ref 2.7–4.5)
POTASSIUM SERPL-SCNC: 4.1 MMOL/L (ref 3.5–5.1)
SODIUM SERPL-SCNC: 138 MMOL/L (ref 136–145)

## 2022-08-11 PROCEDURE — 86704 HEP B CORE ANTIBODY TOTAL: CPT | Performed by: INTERNAL MEDICINE

## 2022-08-11 PROCEDURE — 80069 RENAL FUNCTION PANEL: CPT | Performed by: INTERNAL MEDICINE

## 2022-08-11 PROCEDURE — 86038 ANTINUCLEAR ANTIBODIES: CPT | Performed by: INTERNAL MEDICINE

## 2022-08-11 PROCEDURE — 83516 IMMUNOASSAY NONANTIBODY: CPT | Performed by: INTERNAL MEDICINE

## 2022-08-11 PROCEDURE — 86706 HEP B SURFACE ANTIBODY: CPT | Performed by: INTERNAL MEDICINE

## 2022-08-11 PROCEDURE — 99213 OFFICE O/P EST LOW 20 MIN: CPT | Mod: PBBFAC | Performed by: INTERNAL MEDICINE

## 2022-08-11 PROCEDURE — 86160 COMPLEMENT ANTIGEN: CPT | Performed by: INTERNAL MEDICINE

## 2022-08-11 PROCEDURE — 86160 COMPLEMENT ANTIGEN: CPT | Mod: 59 | Performed by: INTERNAL MEDICINE

## 2022-08-11 PROCEDURE — 86255 FLUORESCENT ANTIBODY SCREEN: CPT | Performed by: INTERNAL MEDICINE

## 2022-08-11 PROCEDURE — 87522 HEPATITIS C REVRS TRNSCRPJ: CPT | Performed by: INTERNAL MEDICINE

## 2022-08-11 PROCEDURE — 87340 HEPATITIS B SURFACE AG IA: CPT | Performed by: INTERNAL MEDICINE

## 2022-08-11 PROCEDURE — 83520 IMMUNOASSAY QUANT NOS NONAB: CPT | Performed by: INTERNAL MEDICINE

## 2022-08-11 PROCEDURE — 99999 PR PBB SHADOW E&M-EST. PATIENT-LVL III: CPT | Mod: PBBFAC,,, | Performed by: INTERNAL MEDICINE

## 2022-08-11 PROCEDURE — 86225 DNA ANTIBODY NATIVE: CPT | Performed by: INTERNAL MEDICINE

## 2022-08-11 PROCEDURE — 86803 HEPATITIS C AB TEST: CPT | Performed by: INTERNAL MEDICINE

## 2022-08-11 PROCEDURE — 99999 PR PBB SHADOW E&M-EST. PATIENT-LVL III: ICD-10-PCS | Mod: PBBFAC,,, | Performed by: INTERNAL MEDICINE

## 2022-08-11 PROCEDURE — 83516 IMMUNOASSAY NONANTIBODY: CPT | Mod: 59 | Performed by: INTERNAL MEDICINE

## 2022-08-11 NOTE — PROGRESS NOTES
Nephrology Clinic Note   8/11/2022    Chief Complaint   Patient presents with    Hyperkalemia      History of present illness:  Patient is a 42 y.o. male.   Presents to the clinic today for medical conditions listed below.  Problem Noted   Acute Renal Failure 8/7/2022    Feliz Lopez is a 42 y.o. male with past medical history of HTN and drug abuse who was recently hospitalized for evaluation of fatigue, lightheadedness, decreased appetite, and decreased urine output. 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 Upon admission he was noted to be oliguric with elevated serum creatine up to 8.7 and metabolic acidosis. He was started on isotonic bicarb after which his renal function started to improve and was subsequently discharged. He presents today to establish care in the Nephrology clinic. Refers feeling well and denies any nausea, vomits, diarrhea, recent contrast administration, dehydration or decreased oral intake.         Review of Systems   Constitutional: Negative for chills, fever and weight loss.   Respiratory: Negative for cough and shortness of breath.    Cardiovascular: Negative for chest pain, claudication and leg swelling.   Gastrointestinal: Negative for abdominal pain, diarrhea, nausea and vomiting.   Genitourinary: Negative for dysuria, flank pain, frequency, hematuria and urgency.       History:  Past Medical History:   Diagnosis Date    Hypertension       No past surgical history on file.     Current Outpatient Medications:     acetaminophen (TYLENOL) 500 MG tablet, Take 1,000 mg by mouth daily as needed for Pain., Disp: , Rfl:     calcium carbonate-magnesium hydroxide (ROLAIDS) 550-110 mg Chew, Take 2-3 tablets by mouth 2 (two) times daily as needed (acid reflux)., Disp: , Rfl:     sulfamethoxazole-trimethoprim 800-160mg (BACTRIM DS) 800-160 mg Tab, Take 1 tablet by mouth 2 (two) times daily. For 10 days,  Disp: , Rfl:     lisinopriL (PRINIVIL,ZESTRIL) 40 MG tablet, Take 0.5 tablets (20 mg total) by mouth once daily. Please do NOT take until you see your PCP (Patient not taking: Reported on 8/11/2022), Disp: 45 tablet, Rfl: 3    naloxone (NARCAN) 4 mg/actuation Spry, 4mg by nasal route as needed for opioid overdose; may repeat every 2-3 minutes in alternating nostrils until medical help arrives. Call 911 (Patient not taking: Reported on 8/11/2022), Disp: 1 each, Rfl: 11    traMADoL (ULTRAM) 50 mg tablet, Take 50 mg by mouth every 6 (six) hours as needed for Pain., Disp: , Rfl:   Review of patient's allergies indicates:  No Known Allergies   Social History     Tobacco Use    Smoking status: Current Every Day Smoker    Smokeless tobacco: Never Used   Substance Use Topics    Alcohol use: Yes      No family history on file.     Physical Exam :  Vitals:    08/11/22 1335   BP: (!) 142/84   Pulse: 92     Physical Exam  Constitutional:       General: He is not in acute distress.     Appearance: He is not ill-appearing or toxic-appearing.   HENT:      Head: Normocephalic and atraumatic.      Nose: Nose normal.      Mouth/Throat:      Mouth: Mucous membranes are moist.   Eyes:      Pupils: Pupils are equal, round, and reactive to light.   Cardiovascular:      Rate and Rhythm: Normal rate.   Pulmonary:      Effort: Pulmonary effort is normal. No respiratory distress.      Breath sounds: No rales.   Abdominal:      General: There is no distension.      Palpations: Abdomen is soft.   Musculoskeletal:         General: No swelling. Normal range of motion.      Right lower leg: No edema.      Left lower leg: No edema.   Skin:     General: Skin is warm and dry.      Coloration: Skin is not jaundiced.   Neurological:      Mental Status: He is alert and oriented to person, place, and time.   Psychiatric:         Mood and Affect: Mood normal.         Labs reviewed   Images Reviewed    Assessment:    1. FREDERICK (acute kidney injury)     2. Acute renal failure, unspecified acute renal failure type        Plan:    Acute renal failure  Pt admitted for oliguric FREDERICK, likely ATN in the setting of hypotension and decreased fluid intake   Baseline serum Cr unclear; at 1.3 back in 2020 however recent ED visit back in June with Cr at 3.9    Serum Cr at 8.8 on admission; down to 2.9 prior to discharge from the hospital   Retroperitoneal US with 2.1cm L sided simple cyst, no hydronephrosis   UPCR with 1.5 g/g of protein during admission   Continue holding Lisinopril 40mg PO daily for now; may resume once renal function stable  Pt appears to be doing well, drinking more water  Has no edema, reports adequate urine output, no foamy urine   Will get repeat labs including immune panel in order to assess resolution of his FREDERICK along with secondary causes for renal dysfunction  RTC in 2 months unless abnormal labs  or worsening of his renal function       No follow-ups on file.     Orders Placed This Encounter   Procedures    Hepatitis B Surface Antigen    HEPATITIS B CORE ANTIBODY, TOTAL    Hepatitis B Surface Ab, Qualitative    HEPATITIS C RNA, QUANTITATIVE, PCR    RENAL FUNCTION PANEL    Urinalysis    Protein / creatinine ratio, urine    Hepatitis C Antibody    C3 Complement    C4 Complement    GLOMERULAR BASEMENT MEMBRANE ANTIBODIES    Anti-Neutrophilic Cytoplasmic Antibody    MYELOPEROXIDASE ANTIBODY (MPO)    PROTEINASE 3 AUTOANTIBODIES    DELIA Screen w/Reflex    ANTI-DNA ANTIBODY, DOUBLE-STRANDED     There are no discontinued medications.   No future appointments.    Alex Lambert

## 2022-08-11 NOTE — ASSESSMENT & PLAN NOTE
Pt admitted for oliguric FREDERICK, likely ATN in the setting of hypotension and decreased fluid intake   Baseline serum Cr unclear; at 1.3 back in 2020 however recent ED visit back in June with Cr at 3.9    Serum Cr at 8.8 on admission; down to 2.9 prior to discharge from the hospital   Retroperitoneal US with 2.1cm L sided simple cyst, no hydronephrosis   UPCR with 1.5 g/g of protein during admission   Continue holding Lisinopril 40mg PO daily for now; may resume once renal function stable  Pt appears to be doing well, drinking more water  Has no edema, reports adequate urine output, no foamy urine   Will get repeat labs including immune panel in order to assess resolution of his FREDERICK along with secondary causes for renal dysfunction  RTC in 2 months unless abnormal labs  or worsening of his renal function

## 2022-08-12 DIAGNOSIS — N17.9 AKI (ACUTE KIDNEY INJURY): Primary | ICD-10-CM

## 2022-08-12 LAB
ANA SER QL IF: NORMAL
BACTERIA BLD CULT: NORMAL
BACTERIA BLD CULT: NORMAL
BM IGG SER-ACNC: <0.2 U
DSDNA AB SER-ACNC: NORMAL [IU]/ML
PROTEINASE3 IGG SER-ACNC: <0.2 U

## 2022-08-13 LAB — HCV RNA SERPL NAA+PROBE-ACNC: NORMAL IU/ML

## 2022-08-15 LAB
ANCA AB TITR SER IF: NORMAL TITER
MYELOPEROXIDASE AB SER-ACNC: 4 UNITS
P-ANCA TITR SER IF: NORMAL TITER

## 2022-08-16 LAB
HBV CORE AB SERPL QL IA: POSITIVE
HBV SURFACE AB SER-ACNC: POSITIVE M[IU]/ML
HBV SURFACE AG SERPL QL IA: NEGATIVE
HCV AB SERPL QL IA: POSITIVE

## 2022-10-17 ENCOUNTER — TELEPHONE (OUTPATIENT)
Dept: NEPHROLOGY | Facility: CLINIC | Age: 42
End: 2022-10-17
Payer: MEDICAID

## 2022-11-14 PROBLEM — N17.9 ACUTE RENAL FAILURE: Status: RESOLVED | Noted: 2022-08-07 | Resolved: 2022-11-14

## 2024-12-21 PROCEDURE — 99284 EMERGENCY DEPT VISIT MOD MDM: CPT

## 2024-12-22 ENCOUNTER — HOSPITAL ENCOUNTER (EMERGENCY)
Facility: OTHER | Age: 44
Discharge: HOME | End: 2024-12-22
Attending: EMERGENCY MEDICINE
Payer: MEDICAID

## 2024-12-22 VITALS
BODY MASS INDEX: 24.11 KG/M2 | RESPIRATION RATE: 18 BRPM | HEIGHT: 66 IN | OXYGEN SATURATION: 98 % | SYSTOLIC BLOOD PRESSURE: 113 MMHG | DIASTOLIC BLOOD PRESSURE: 62 MMHG | HEART RATE: 74 BPM | TEMPERATURE: 99 F | WEIGHT: 150 LBS

## 2024-12-22 DIAGNOSIS — L03.011 CELLULITIS OF FINGER OF RIGHT HAND: Primary | ICD-10-CM

## 2024-12-22 DIAGNOSIS — S61.411A LACERATION OF RIGHT HAND, FOREIGN BODY PRESENCE UNSPECIFIED, INITIAL ENCOUNTER: ICD-10-CM

## 2024-12-22 PROCEDURE — 63600175 PHARM REV CODE 636 W HCPCS: Performed by: EMERGENCY MEDICINE

## 2024-12-22 PROCEDURE — 25000003 PHARM REV CODE 250: Performed by: EMERGENCY MEDICINE

## 2024-12-22 PROCEDURE — 96372 THER/PROPH/DIAG INJ SC/IM: CPT | Performed by: EMERGENCY MEDICINE

## 2024-12-22 RX ORDER — NAPROXEN 500 MG/1
500 TABLET ORAL 2 TIMES DAILY PRN
Qty: 14 TABLET | Refills: 0 | Status: SHIPPED | OUTPATIENT
Start: 2024-12-22 | End: 2024-12-22

## 2024-12-22 RX ORDER — MUPIROCIN 20 MG/G
OINTMENT TOPICAL 3 TIMES DAILY
Qty: 15 G | Refills: 0 | Status: SHIPPED | OUTPATIENT
Start: 2024-12-22 | End: 2024-12-22

## 2024-12-22 RX ORDER — DOXYCYCLINE 100 MG/1
100 CAPSULE ORAL 2 TIMES DAILY
Qty: 14 CAPSULE | Refills: 0 | Status: SHIPPED | OUTPATIENT
Start: 2024-12-22 | End: 2024-12-22

## 2024-12-22 RX ORDER — NAPROXEN 500 MG/1
500 TABLET ORAL
Status: COMPLETED | OUTPATIENT
Start: 2024-12-22 | End: 2024-12-22

## 2024-12-22 RX ORDER — MUPIROCIN 20 MG/G
1 OINTMENT TOPICAL
Status: COMPLETED | OUTPATIENT
Start: 2024-12-22 | End: 2024-12-22

## 2024-12-22 RX ORDER — DOXYCYCLINE 100 MG/1
100 CAPSULE ORAL 2 TIMES DAILY
Qty: 14 CAPSULE | Refills: 0 | Status: SHIPPED | OUTPATIENT
Start: 2024-12-22 | End: 2024-12-29

## 2024-12-22 RX ORDER — NAPROXEN 500 MG/1
500 TABLET ORAL 2 TIMES DAILY PRN
Qty: 14 TABLET | Refills: 0 | Status: SHIPPED | OUTPATIENT
Start: 2024-12-22 | End: 2024-12-29

## 2024-12-22 RX ORDER — CEFAZOLIN SODIUM 1 G/3ML
1 INJECTION, POWDER, FOR SOLUTION INTRAMUSCULAR; INTRAVENOUS
Status: COMPLETED | OUTPATIENT
Start: 2024-12-22 | End: 2024-12-22

## 2024-12-22 RX ORDER — MUPIROCIN 20 MG/G
OINTMENT TOPICAL 3 TIMES DAILY
Qty: 15 G | Refills: 0 | Status: SHIPPED | OUTPATIENT
Start: 2024-12-22 | End: 2025-01-01

## 2024-12-22 RX ORDER — DOXYCYCLINE HYCLATE 100 MG
100 TABLET ORAL
Status: COMPLETED | OUTPATIENT
Start: 2024-12-22 | End: 2024-12-22

## 2024-12-22 RX ADMIN — DOXYCYCLINE HYCLATE 100 MG: 100 TABLET, COATED ORAL at 02:12

## 2024-12-22 RX ADMIN — NAPROXEN 500 MG: 500 TABLET ORAL at 02:12

## 2024-12-22 RX ADMIN — MUPIROCIN 1 TUBE: 20 OINTMENT TOPICAL at 02:12

## 2024-12-22 RX ADMIN — CEFAZOLIN 1 G: 330 INJECTION, POWDER, FOR SOLUTION INTRAMUSCULAR; INTRAVENOUS at 02:12

## 2024-12-22 NOTE — DISCHARGE INSTRUCTIONS
Good luck with your sobriety.      You are medically cleared for return to [facility].    You appear to have cellulitis of here right index finger.  Giving your possible exposure to raw seafood in the wound, we will start you on doxycycline and gave you an antibiotic shot (ancef). Also please apply mupirocin to the wound as directed, then dress with Xeroform gauze.  Please follow up with hand specialist in 2-3 days for re-evaluation.  Return here if symptoms worsen.  Because your laceration occurred 2 days ago, risk of closing the wound exceeds benefit.     Thank you for letting us take care of you today! It was nice meeting you, and I hope you feel better soon.     Call your primary care doctor to make the first available appointment.     Keep all your medical appointments.     Take your regular medication as prescribed. Contact your primary care provider before running out of medication, or for any problems obtaining them.    Do not drive or operate heavy machinery while taking opioid or muscle relaxing medications. Examples include norco, percocet, xanax, valium, flexeril.     Overuse or long term use of pain and sedating medication may lead to addiction, dependence, organ failure, family and work problems, legal problems, accidental overdose, and death.    If you do not have health insurance, you probably can afford it:  Call 1-877.396.9948 ([facility] hotline) or go to www.[place].[place].gov    Your evaluation in the ER does not suggest any emergent or life threatening medical condition requiring admission or immediate intervention beyond that provided in the ER.   However, the signs of a serious problem sometimes take more time to appear.     Do not hesitate to return to the ER if any of the following occur:    Weakness, dizziness, fainting, or loss of consciousness   Fever of 100.4ºF (38ºC) or higher  Any worse symptoms  Any new or concerning symptoms        Keep the wound clean. Two to 3 times daily clean it gently  with soap and water, pat dry, apply antibiotic ointment, re-bandage with yellow xeroform gauze and tape.  Return to the ER if the area becomes more red, more warm, more swollen, or more painful.    Limit sun exposure and use sunscreen on the area for 1 year to minimize scarring.

## 2024-12-22 NOTE — ED TRIAGE NOTES
Feliz Satnam Lopez, a 44 y.o. male presents to the ED complaining of swelling and abrasion to the right hand, 2nd digit.     Patient is A&Ox4. Denies any other complaints. ED workup in progress. VSS. Safety measures in place; side rails up x2. Call light within pt reach. Plan of care ongoing.    Chief Complaint   Patient presents with    Hand Pain     RUE laceration that occurred 2 days ago  Reports it occurred while using the      2nd digit to the R hand presents tender to tough w/ swelling noted  Warm to touch  Unknown last tetanus

## 2024-12-22 NOTE — ED PROVIDER NOTES
"  Source of History:  Medical record, patient    Chief complaint:  Per triage note: "Hand Pain (RUE laceration that occurred 2 days ago/Reports it occurred while using the  //2nd digit to the R hand presents tender to tough w/ swelling noted/Warm to touch/Unknown last tetanus)  "    HPI:    Patient presents for evaluation of laceration in his right index finger which occurred 2 days ago while reaching into a  at work loaded with dirty dishes including dishes that had exposure to raw seafood.  He noted erythema and edema yesterday extending over his dorsal hand to the base of the fourth digit.  The erythema and edema are much improved today, now only involving the first digit.  He denies any focal deficits.  He describes washing it with antiseptic soap multiple times.  He denies any drainage.      ROS:   See HPI for pertinent review of systems      Review of patient's allergies indicates:  No Known Allergies    PMH:  As per HPI and below:  Past Medical History:   Diagnosis Date    Hypertension        Past Surgical History:   Procedure Laterality Date    HERNIA REPAIR         Social History     Tobacco Use    Smoking status: Every Day     Current packs/day: 0.25     Average packs/day: 0.3 packs/day for 32.0 years (8.0 ttl pk-yrs)     Types: Cigarettes     Start date: 1993   Substance Use Topics    Alcohol use: Not Currently     Comment: last alcohol intake was 4 months ago    Drug use: Not Currently     Types: Methamphetamines, Heroin     Comment: last used of heroin and methamphetamines was 4 months ago       Physical Exam:      Nursing note and vitals reviewed.  /83 (BP Location: Left arm)   Pulse 79   Temp 98.4 °F (36.9 °C) (Oral)   Resp 16   Ht 5' 6" (1.676 m)   Wt 68 kg (150 lb)   SpO2 97%   BMI 24.21 kg/m²     Constitutional: AAOx3. Well appearing.   Eyes: EOMI. No discharge. Anicteric.  HENT:   Mouth/Throat:    Neck: Normal range of motion. Neck supple.    Cardiovascular: Normal " rate  Pulmonary/Chest: No respiratory distress.   Abdominal: No distension   Musculoskeletal: Normal range of motion.   Right hand:  Scattered small abrasions, lacerations, small healing wounds.  Abrasion at radial aspect of base of second digit with surrounding erythema, edema, warmth.  No pain on finger extension, finger not held in flexion, finger without pronounced fusiform swelling compared to other fingers, no percussion tenderness along tendon sheath.  Abrasion near PIP without surrounding erythema or edema.    Neurological: GCS15. Alert and oriented to person, place, and time. No gross cranial nerve II-XII, focal strength, or light touch deficit. Steady gait.   Skin: Skin is warm and dry.                   Medical Decision Making / Independent Interpretations / External Records Reviewed:      Patient is a 44-year-old male with history of substance use disorder, currently residential rehabilitation, works in a kitchen, is right-hand dominant who presents for evaluation of right index finger pain, erythema, edema after sustaining abrasion against broken item in a . Some of the dishes had contact with raw seafood.   On exam, patient has erythema, edema at the radial base of the right index finger, negative for any can able signs (all of the patient's fingers have a somewhat fusiform shape; this digit's size and shape is not disproportionate to the others).    Clinically, patient's symptoms are consistent with cellulitis.  It is reassuring that his erythema and edema was much worse yesterday without any specific treatment.  Given potential contact with raw seafood, I ordered IM Ancef, as well as initial dose of oral doxycycline.  History and physical is not consistent with tenosynovitis, or abscess.  We will have the patient follow up with hand specialty for re-evaluation.   --  I discussed with pt and/or guardian/caretaker that this evaluation in the ED does not suggest any emergent or life  threatening medical condition requiring admission or further immediate intervention or diagnostics. Regardless, an unremarkable evaluation in the ED does not preclude the development or presence of a serious or life threatening condition. Pt was instructed to return for any worsening, new, changed, or concerning symptoms.     I had a detailed discussion with patient and/or guardian/caretaker regarding findings, plan, return precautions, importance of medication adherence, need to follow-up with a PCP and specialist. All questions answered.     Note was created using voice recognition software. It may have occasional typographical errors not identified and edited despite initial review prior to signing.                  Procedures    --  I decided to obtain the patient's medical records. I reviewed patient's prior external notes / results: specialist documentation   .   --  Additional Medical Decision Making: Prescription drug management and Decision regarding emergent surgery / procedure    Pt seen at a time of critical national shortage of IV fluids, affecting decision making and treatment considerations.       Medications   ceFAZolin injection 1 g (has no administration in time range)   doxycycline tablet 100 mg (has no administration in time range)   naproxen tablet 500 mg (has no administration in time range)   mupirocin 2 % ointment 1 Tube (has no administration in time range)              No future appointments.       Diagnostic Impression:    1. Cellulitis of finger of right hand    2. Laceration of right hand, foreign body presence unspecified, initial encounter             ED Disposition Condition    Discharge Good          ED Prescriptions       Medication Sig Dispense Start Date End Date Auth. Provider    doxycycline (VIBRAMYCIN) 100 MG Cap  (Status: Discontinued) Take 1 capsule (100 mg total) by mouth 2 (two) times daily. for 7 days 14 capsule 12/22/2024 12/22/2024 Jam Grissom MD    naproxen  (NAPROSYN) 500 MG tablet  (Status: Discontinued) Take 1 tablet (500 mg total) by mouth 2 (two) times daily as needed. 14 tablet 12/22/2024 12/22/2024 Jam Grissom MD    mupirocin (BACTROBAN) 2 % ointment  (Status: Discontinued) Apply topically 3 (three) times daily. To affected area for 10 days 15 g 12/22/2024 12/22/2024 Jam Grissom MD    doxycycline (VIBRAMYCIN) 100 MG Cap Take 1 capsule (100 mg total) by mouth 2 (two) times daily. for 7 days 14 capsule 12/22/2024 12/29/2024 Jam Grissom MD    mupirocin (BACTROBAN) 2 % ointment Apply topically 3 (three) times daily. To affected area for 10 days 15 g 12/22/2024 1/1/2025 Jam Grissom MD    naproxen (NAPROSYN) 500 MG tablet Take 1 tablet (500 mg total) by mouth 2 (two) times daily as needed. 14 tablet 12/22/2024 12/29/2024 Jam Grissom MD          Follow-up Information       Follow up With Specialties Details Why Contact Info Additional Information    [facility] Schedule an appointment as soon as possible for a visit in 1 day For recheck with specialist [id number], [address]  [facility] [address] [phone] Turn at Entrance [address] on [facility] in [facility] and take elevators to Floor 2. Follow signs to [facility]. Take [facility]v[address] to Floor 9 for [id number].               Jam Grissom MD  12/22/24 [id number]